# Patient Record
Sex: MALE | Race: WHITE | NOT HISPANIC OR LATINO | Employment: OTHER | URBAN - METROPOLITAN AREA
[De-identification: names, ages, dates, MRNs, and addresses within clinical notes are randomized per-mention and may not be internally consistent; named-entity substitution may affect disease eponyms.]

---

## 2023-03-07 PROBLEM — E53.8 VITAMIN B 12 DEFICIENCY: Status: ACTIVE | Noted: 2023-03-07

## 2023-03-07 PROBLEM — M54.12 RADICULITIS INVOLVING UPPER EXTREMITY: Status: ACTIVE | Noted: 2023-03-07

## 2023-03-07 PROBLEM — R60.9 EDEMA: Status: ACTIVE | Noted: 2023-03-07

## 2023-03-07 PROBLEM — E78.5 HYPERLIPIDEMIA: Status: ACTIVE | Noted: 2023-03-07

## 2023-03-07 PROBLEM — B35.4 TINEA CORPORIS: Status: ACTIVE | Noted: 2023-03-07

## 2023-03-07 PROBLEM — E66.01 CLASS 2 SEVERE OBESITY DUE TO EXCESS CALORIES WITH SERIOUS COMORBIDITY AND BODY MASS INDEX (BMI) OF 39.0 TO 39.9 IN ADULT (MULTI): Status: ACTIVE | Noted: 2023-03-07

## 2023-03-07 PROBLEM — F32.A DEPRESSION: Status: ACTIVE | Noted: 2023-03-07

## 2023-03-07 PROBLEM — D72.819 LEUKOPENIA: Status: ACTIVE | Noted: 2023-03-07

## 2023-03-07 PROBLEM — M19.90 OSTEOARTHRITIS: Status: ACTIVE | Noted: 2023-03-07

## 2023-03-07 PROBLEM — L08.9 INFECTED SEBACEOUS CYST: Status: ACTIVE | Noted: 2023-03-07

## 2023-03-07 PROBLEM — M65.312 TRIGGER FINGER OF LEFT THUMB: Status: ACTIVE | Noted: 2023-03-07

## 2023-03-07 PROBLEM — E66.812 CLASS 2 OBESITY WITH BODY MASS INDEX (BMI) OF 39.0 TO 39.9 IN ADULT: Status: ACTIVE | Noted: 2023-03-07

## 2023-03-07 PROBLEM — R20.0 NUMBNESS OF TOES: Status: ACTIVE | Noted: 2023-03-07

## 2023-03-07 PROBLEM — G47.33 OBSTRUCTIVE SLEEP APNEA, ADULT: Status: ACTIVE | Noted: 2023-03-07

## 2023-03-07 PROBLEM — M79.644 BILATERAL THUMB PAIN: Status: ACTIVE | Noted: 2023-03-07

## 2023-03-07 PROBLEM — H91.93 HEARING LOSS, BILATERAL: Status: ACTIVE | Noted: 2023-03-07

## 2023-03-07 PROBLEM — E66.9 CLASS 2 OBESITY WITH BODY MASS INDEX (BMI) OF 39.0 TO 39.9 IN ADULT: Status: ACTIVE | Noted: 2023-03-07

## 2023-03-07 PROBLEM — L71.9 ROSACEA: Status: ACTIVE | Noted: 2023-03-07

## 2023-03-07 PROBLEM — M79.645 BILATERAL THUMB PAIN: Status: ACTIVE | Noted: 2023-03-07

## 2023-03-07 PROBLEM — R11.0 NAUSEA: Status: ACTIVE | Noted: 2023-03-07

## 2023-03-07 PROBLEM — M65.4 DE QUERVAIN'S TENOSYNOVITIS, LEFT: Status: ACTIVE | Noted: 2023-03-07

## 2023-03-07 PROBLEM — N17.9 AKI (ACUTE KIDNEY INJURY) (CMS-HCC): Status: ACTIVE | Noted: 2023-03-07

## 2023-03-07 PROBLEM — N52.9 ERECTILE DYSFUNCTION: Status: ACTIVE | Noted: 2023-03-07

## 2023-03-07 PROBLEM — E66.812 CLASS 2 SEVERE OBESITY DUE TO EXCESS CALORIES WITH SERIOUS COMORBIDITY AND BODY MASS INDEX (BMI) OF 39.0 TO 39.9 IN ADULT: Status: ACTIVE | Noted: 2023-03-07

## 2023-03-07 PROBLEM — M54.10 RADICULITIS OF LEG: Status: ACTIVE | Noted: 2023-03-07

## 2023-03-07 PROBLEM — R61 DIAPHORESIS: Status: ACTIVE | Noted: 2023-03-07

## 2023-03-07 PROBLEM — E53.8 FOLIC ACID DEFICIENCY: Status: ACTIVE | Noted: 2023-03-07

## 2023-03-07 PROBLEM — B35.1 ONYCHOMYCOSIS OF TOENAIL: Status: ACTIVE | Noted: 2023-03-07

## 2023-03-07 PROBLEM — E11.9 DIABETES MELLITUS TYPE 2, CONTROLLED (MULTI): Status: ACTIVE | Noted: 2023-03-07

## 2023-03-07 PROBLEM — J30.9 ALLERGIC RHINITIS: Status: ACTIVE | Noted: 2023-03-07

## 2023-03-07 PROBLEM — M17.12 PRIMARY LOCALIZED OSTEOARTHRITIS OF LEFT KNEE: Status: ACTIVE | Noted: 2023-03-07

## 2023-03-07 PROBLEM — R31.0 GROSS HEMATURIA: Status: ACTIVE | Noted: 2023-03-07

## 2023-03-07 PROBLEM — M18.0 ARTHRITIS OF CARPOMETACARPAL (CMC) JOINT OF BOTH THUMBS: Status: ACTIVE | Noted: 2023-03-07

## 2023-03-07 PROBLEM — L72.3 INFECTED SEBACEOUS CYST: Status: ACTIVE | Noted: 2023-03-07

## 2023-03-07 PROBLEM — N40.0 BENIGN PROSTATIC HYPERPLASIA: Status: ACTIVE | Noted: 2023-03-07

## 2023-03-07 PROBLEM — R26.2 AMBULATORY DYSFUNCTION: Status: ACTIVE | Noted: 2023-03-07

## 2023-03-07 RX ORDER — BLOOD SUGAR DIAGNOSTIC
STRIP MISCELLANEOUS
COMMUNITY
Start: 2022-08-31 | End: 2024-02-14 | Stop reason: SDUPTHER

## 2023-03-07 RX ORDER — FUROSEMIDE 40 MG/1
1 TABLET ORAL DAILY
COMMUNITY
Start: 2021-06-08 | End: 2023-07-10

## 2023-03-07 RX ORDER — DOXYCYCLINE HYCLATE 50 MG/1
CAPSULE ORAL
COMMUNITY
Start: 2022-07-28 | End: 2023-03-08 | Stop reason: ALTCHOICE

## 2023-03-07 RX ORDER — FENOFIBRATE 160 MG/1
1 TABLET ORAL DAILY
COMMUNITY
Start: 2020-09-14 | End: 2023-03-08

## 2023-03-07 RX ORDER — ISOSORBIDE MONONITRATE 30 MG/1
1 TABLET, EXTENDED RELEASE ORAL DAILY
COMMUNITY
Start: 2022-08-04 | End: 2023-07-10

## 2023-03-07 RX ORDER — SIMVASTATIN 80 MG/1
1 TABLET, FILM COATED ORAL DAILY
COMMUNITY
Start: 2014-02-17 | End: 2023-03-08

## 2023-03-07 RX ORDER — ASPIRIN 81 MG/1
1 TABLET ORAL DAILY
COMMUNITY
Start: 2015-10-28 | End: 2023-03-08

## 2023-03-07 RX ORDER — FLUTICASONE PROPIONATE 50 MCG
2 SPRAY, SUSPENSION (ML) NASAL DAILY
COMMUNITY
Start: 2016-10-11

## 2023-03-07 RX ORDER — DAPAGLIFLOZIN 10 MG/1
1 TABLET, FILM COATED ORAL DAILY
COMMUNITY
End: 2023-05-04 | Stop reason: SDUPTHER

## 2023-03-08 ENCOUNTER — OFFICE VISIT (OUTPATIENT)
Dept: PRIMARY CARE | Facility: CLINIC | Age: 78
End: 2023-03-08
Payer: MEDICARE

## 2023-03-08 VITALS
OXYGEN SATURATION: 97 % | RESPIRATION RATE: 16 BRPM | BODY MASS INDEX: 39.14 KG/M2 | SYSTOLIC BLOOD PRESSURE: 128 MMHG | HEIGHT: 71 IN | WEIGHT: 279.6 LBS | HEART RATE: 55 BPM | TEMPERATURE: 96.6 F | DIASTOLIC BLOOD PRESSURE: 64 MMHG
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
## 2023-03-08 PROCEDURE — 1157F ADVNC CARE PLAN IN RCRD: CPT | Performed by: STUDENT IN AN ORGANIZED HEALTH CARE EDUCATION/TRAINING PROGRAM

## 2023-03-08 PROCEDURE — 99213 OFFICE O/P EST LOW 20 MIN: CPT | Performed by: STUDENT IN AN ORGANIZED HEALTH CARE EDUCATION/TRAINING PROGRAM

## 2023-03-08 PROCEDURE — 1160F RVW MEDS BY RX/DR IN RCRD: CPT | Performed by: STUDENT IN AN ORGANIZED HEALTH CARE EDUCATION/TRAINING PROGRAM

## 2023-03-08 PROCEDURE — 1159F MED LIST DOCD IN RCRD: CPT | Performed by: STUDENT IN AN ORGANIZED HEALTH CARE EDUCATION/TRAINING PROGRAM

## 2023-03-08 PROCEDURE — 1036F TOBACCO NON-USER: CPT | Performed by: STUDENT IN AN ORGANIZED HEALTH CARE EDUCATION/TRAINING PROGRAM

## 2023-03-08 RX ORDER — MUPIROCIN CALCIUM 20 MG/G
CREAM TOPICAL 3 TIMES DAILY
Qty: 15 G | Refills: 0 | Status: SHIPPED | OUTPATIENT
Start: 2023-03-08 | End: 2023-03-18

## 2023-03-08 ASSESSMENT — ENCOUNTER SYMPTOMS
LOSS OF SENSATION IN FEET: 0
OCCASIONAL FEELINGS OF UNSTEADINESS: 0
DEPRESSION: 0

## 2023-03-08 NOTE — PROGRESS NOTES
Subjective   Patient ID: Qamar Lowe Jr. is a 77 y.o. male who presents for Animal Bite (Pt is here for a dog bite that occurred on Saturday. He was seen at the Urgent Care and was given a tetanus shot.).  Pt was at his mother's 89th birthday party when a dog lunged at his niece. He got in front of her and the dog latched onto his left hand. He had several puncture wounds on his left hand. He presented to the urgent care that night.     Urgent care: he was given Td update and augmentin ds on Sunday   Bit Saturday but did not start medications until Sunday.         Animal Bite   The incident occurred more than 2 days ago. There is an injury to the Left hand. The pain is mild. It is unlikely that a foreign body is present. Pertinent negatives include no fussiness and no numbness. There have been no prior injuries to these areas. He is Right-handed. His tetanus status is UTD. He has been Behaving normally.       Review of Systems   Neurological:  Negative for numbness.       Objective   Physical Exam  Vitals reviewed.   Left thumb 4 bite marks posterior hand one puncutre castro   Mild erythema noted, no swelling, joint movement not impaired     Assessment/Plan   Diagnoses and all orders for this visit:  Dog bite, initial encounter  Comments:  mild erythema  no fluctuation, fever or signs of systemic disease   finish antibiotic course   keep wound open when he can once stops bleeding   no neosporin  Orders:  -     mupirocin (Bactroban) 2 % cream; Apply topically 3 times a day for 10 days. apply to affected area

## 2023-03-22 ASSESSMENT — ENCOUNTER SYMPTOMS
NUMBNESS: 0
FUSSINESS: 0

## 2023-05-04 DIAGNOSIS — E11.9 CONTROLLED TYPE 2 DIABETES MELLITUS WITHOUT COMPLICATION, WITHOUT LONG-TERM CURRENT USE OF INSULIN (MULTI): ICD-10-CM

## 2023-05-04 RX ORDER — DAPAGLIFLOZIN 10 MG/1
10 TABLET, FILM COATED ORAL DAILY
Qty: 90 TABLET | Refills: 1 | Status: SHIPPED | OUTPATIENT
Start: 2023-05-04 | End: 2023-06-29

## 2023-05-08 DIAGNOSIS — E78.5 HYPERLIPIDEMIA, UNSPECIFIED HYPERLIPIDEMIA TYPE: Primary | ICD-10-CM

## 2023-05-09 RX ORDER — FENOFIBRATE 160 MG/1
TABLET ORAL
Qty: 90 TABLET | Refills: 3 | Status: SHIPPED | OUTPATIENT
Start: 2023-05-09 | End: 2024-02-14

## 2023-05-12 LAB
ALANINE AMINOTRANSFERASE (SGPT) (U/L) IN SER/PLAS: 13 U/L (ref 10–52)
ALBUMIN (G/DL) IN SER/PLAS: 3.7 G/DL (ref 3.4–5)
ALKALINE PHOSPHATASE (U/L) IN SER/PLAS: 49 U/L (ref 33–136)
ANION GAP IN SER/PLAS: 12 MMOL/L (ref 10–20)
ASPARTATE AMINOTRANSFERASE (SGOT) (U/L) IN SER/PLAS: 19 U/L (ref 9–39)
BASOPHILS (10*3/UL) IN BLOOD BY AUTOMATED COUNT: 0.02 X10E9/L (ref 0–0.1)
BASOPHILS/100 LEUKOCYTES IN BLOOD BY AUTOMATED COUNT: 0.5 % (ref 0–2)
BILIRUBIN TOTAL (MG/DL) IN SER/PLAS: 0.7 MG/DL (ref 0–1.2)
CALCIUM (MG/DL) IN SER/PLAS: 8.7 MG/DL (ref 8.6–10.6)
CARBON DIOXIDE, TOTAL (MMOL/L) IN SER/PLAS: 26 MMOL/L (ref 21–32)
CHLORIDE (MMOL/L) IN SER/PLAS: 110 MMOL/L (ref 98–107)
CREATININE (MG/DL) IN SER/PLAS: 0.85 MG/DL (ref 0.5–1.3)
EOSINOPHILS (10*3/UL) IN BLOOD BY AUTOMATED COUNT: 0.14 X10E9/L (ref 0–0.4)
EOSINOPHILS/100 LEUKOCYTES IN BLOOD BY AUTOMATED COUNT: 3.8 % (ref 0–6)
ERYTHROCYTE DISTRIBUTION WIDTH (RATIO) BY AUTOMATED COUNT: 13.3 % (ref 11.5–14.5)
ERYTHROCYTE MEAN CORPUSCULAR HEMOGLOBIN CONCENTRATION (G/DL) BY AUTOMATED: 32.5 G/DL (ref 32–36)
ERYTHROCYTE MEAN CORPUSCULAR VOLUME (FL) BY AUTOMATED COUNT: 91 FL (ref 80–100)
ERYTHROCYTES (10*6/UL) IN BLOOD BY AUTOMATED COUNT: 4.96 X10E12/L (ref 4.5–5.9)
GFR MALE: 88 ML/MIN/1.73M2
GLUCOSE (MG/DL) IN SER/PLAS: 108 MG/DL (ref 74–99)
HEMATOCRIT (%) IN BLOOD BY AUTOMATED COUNT: 45.2 % (ref 41–52)
HEMOGLOBIN (G/DL) IN BLOOD: 14.7 G/DL (ref 13.5–17.5)
IMMATURE GRANULOCYTES/100 LEUKOCYTES IN BLOOD BY AUTOMATED COUNT: 0.3 % (ref 0–0.9)
LEUKOCYTES (10*3/UL) IN BLOOD BY AUTOMATED COUNT: 3.7 X10E9/L (ref 4.4–11.3)
LYMPHOCYTES (10*3/UL) IN BLOOD BY AUTOMATED COUNT: 1.11 X10E9/L (ref 0.8–3)
LYMPHOCYTES/100 LEUKOCYTES IN BLOOD BY AUTOMATED COUNT: 29.8 % (ref 13–44)
MONOCYTES (10*3/UL) IN BLOOD BY AUTOMATED COUNT: 0.42 X10E9/L (ref 0.05–0.8)
MONOCYTES/100 LEUKOCYTES IN BLOOD BY AUTOMATED COUNT: 11.3 % (ref 2–10)
NEUTROPHILS (10*3/UL) IN BLOOD BY AUTOMATED COUNT: 2.03 X10E9/L (ref 1.6–5.5)
NEUTROPHILS/100 LEUKOCYTES IN BLOOD BY AUTOMATED COUNT: 54.3 % (ref 40–80)
NRBC (PER 100 WBCS) BY AUTOMATED COUNT: 0 /100 WBC (ref 0–0)
PLATELETS (10*3/UL) IN BLOOD AUTOMATED COUNT: 247 X10E9/L (ref 150–450)
POTASSIUM (MMOL/L) IN SER/PLAS: 4.5 MMOL/L (ref 3.5–5.3)
PROTEIN TOTAL: 6 G/DL (ref 6.4–8.2)
SODIUM (MMOL/L) IN SER/PLAS: 143 MMOL/L (ref 136–145)
UREA NITROGEN (MG/DL) IN SER/PLAS: 21 MG/DL (ref 6–23)

## 2023-05-22 ENCOUNTER — OFFICE VISIT (OUTPATIENT)
Dept: PRIMARY CARE | Facility: CLINIC | Age: 78
End: 2023-05-22
Payer: MEDICARE

## 2023-05-22 VITALS
RESPIRATION RATE: 16 BRPM | BODY MASS INDEX: 38.38 KG/M2 | SYSTOLIC BLOOD PRESSURE: 120 MMHG | HEIGHT: 71 IN | HEART RATE: 58 BPM | DIASTOLIC BLOOD PRESSURE: 68 MMHG | WEIGHT: 274.14 LBS | OXYGEN SATURATION: 96 %

## 2023-05-22 DIAGNOSIS — E66.01 CLASS 2 SEVERE OBESITY DUE TO EXCESS CALORIES WITH SERIOUS COMORBIDITY AND BODY MASS INDEX (BMI) OF 39.0 TO 39.9 IN ADULT (MULTI): ICD-10-CM

## 2023-05-22 DIAGNOSIS — E53.8 FOLIC ACID DEFICIENCY: ICD-10-CM

## 2023-05-22 DIAGNOSIS — R35.0 BENIGN PROSTATIC HYPERPLASIA WITH URINARY FREQUENCY: ICD-10-CM

## 2023-05-22 DIAGNOSIS — N40.1 BENIGN PROSTATIC HYPERPLASIA WITH URINARY FREQUENCY: ICD-10-CM

## 2023-05-22 DIAGNOSIS — E53.8 VITAMIN B 12 DEFICIENCY: ICD-10-CM

## 2023-05-22 DIAGNOSIS — E78.5 HYPERLIPIDEMIA, UNSPECIFIED HYPERLIPIDEMIA TYPE: ICD-10-CM

## 2023-05-22 DIAGNOSIS — E11.8 CONTROLLED TYPE 2 DIABETES MELLITUS WITH COMPLICATION, WITHOUT LONG-TERM CURRENT USE OF INSULIN (MULTI): Primary | ICD-10-CM

## 2023-05-22 PROBLEM — R11.0 NAUSEA: Status: RESOLVED | Noted: 2023-03-07 | Resolved: 2023-05-22

## 2023-05-22 PROBLEM — R31.0 GROSS HEMATURIA: Status: RESOLVED | Noted: 2023-03-07 | Resolved: 2023-05-22

## 2023-05-22 PROBLEM — B35.4 TINEA CORPORIS: Status: RESOLVED | Noted: 2023-03-07 | Resolved: 2023-05-22

## 2023-05-22 PROBLEM — L08.9 INFECTED SEBACEOUS CYST: Status: RESOLVED | Noted: 2023-03-07 | Resolved: 2023-05-22

## 2023-05-22 PROBLEM — N17.9 AKI (ACUTE KIDNEY INJURY) (CMS-HCC): Status: RESOLVED | Noted: 2023-03-07 | Resolved: 2023-05-22

## 2023-05-22 PROBLEM — L72.3 INFECTED SEBACEOUS CYST: Status: RESOLVED | Noted: 2023-03-07 | Resolved: 2023-05-22

## 2023-05-22 LAB — POC HEMOGLOBIN A1C: 6.1 % (ref 4.2–6.5)

## 2023-05-22 PROCEDURE — 1160F RVW MEDS BY RX/DR IN RCRD: CPT | Performed by: STUDENT IN AN ORGANIZED HEALTH CARE EDUCATION/TRAINING PROGRAM

## 2023-05-22 PROCEDURE — 1036F TOBACCO NON-USER: CPT | Performed by: STUDENT IN AN ORGANIZED HEALTH CARE EDUCATION/TRAINING PROGRAM

## 2023-05-22 PROCEDURE — 83036 HEMOGLOBIN GLYCOSYLATED A1C: CPT | Performed by: STUDENT IN AN ORGANIZED HEALTH CARE EDUCATION/TRAINING PROGRAM

## 2023-05-22 PROCEDURE — 1159F MED LIST DOCD IN RCRD: CPT | Performed by: STUDENT IN AN ORGANIZED HEALTH CARE EDUCATION/TRAINING PROGRAM

## 2023-05-22 PROCEDURE — 1157F ADVNC CARE PLAN IN RCRD: CPT | Performed by: STUDENT IN AN ORGANIZED HEALTH CARE EDUCATION/TRAINING PROGRAM

## 2023-05-22 PROCEDURE — 99214 OFFICE O/P EST MOD 30 MIN: CPT | Performed by: STUDENT IN AN ORGANIZED HEALTH CARE EDUCATION/TRAINING PROGRAM

## 2023-05-22 RX ORDER — TAMSULOSIN HYDROCHLORIDE 0.4 MG/1
0.4 CAPSULE ORAL DAILY
Qty: 90 CAPSULE | Refills: 1 | Status: SHIPPED | OUTPATIENT
Start: 2023-05-22 | End: 2024-05-21

## 2023-05-22 RX ORDER — SIMVASTATIN 80 MG/1
TABLET, FILM COATED ORAL
COMMUNITY
Start: 2023-05-15 | End: 2023-07-10

## 2023-05-22 RX ORDER — AMOXICILLIN 500 MG/1
CAPSULE ORAL
COMMUNITY
Start: 2023-04-20 | End: 2023-08-17 | Stop reason: ALTCHOICE

## 2023-05-22 ASSESSMENT — PATIENT HEALTH QUESTIONNAIRE - PHQ9
2. FEELING DOWN, DEPRESSED OR HOPELESS: NOT AT ALL
1. LITTLE INTEREST OR PLEASURE IN DOING THINGS: NOT AT ALL
SUM OF ALL RESPONSES TO PHQ9 QUESTIONS 1 AND 2: 0

## 2023-05-22 ASSESSMENT — ENCOUNTER SYMPTOMS
FATIGUE: 0
POLYDIPSIA: 0
BLURRED VISION: 0

## 2023-05-22 NOTE — PROGRESS NOTES
Subjective   Patient ID: Qamar Lowe Jr. is a 78 y.o. male who presents for Diabetes (Pt is here for a 6 month follow up.).    Pain across the chest worse with head movement   Lasted two days then went away   Two months ago   Lifting at work     Diabetes  He presents for his follow-up diabetic visit. He has type 2 diabetes mellitus. No MedicAlert identification noted. His disease course has been stable (doing well, log brought in with him today). Pertinent negatives for diabetes include no blurred vision, no chest pain, no fatigue, no foot ulcerations and no polydipsia.       Review of Systems   Constitutional:  Negative for fatigue.   Eyes:  Negative for blurred vision.   Cardiovascular:  Negative for chest pain.   Endocrine: Negative for polydipsia.       Objective   Physical Exam  Constitutional:       Appearance: Normal appearance.   Cardiovascular:      Rate and Rhythm: Normal rate and regular rhythm.      Heart sounds: No murmur heard.  Pulmonary:      Effort: Pulmonary effort is normal. No respiratory distress.      Breath sounds: Normal breath sounds. No wheezing.   Musculoskeletal:      Cervical back: Neck supple.      Left lower leg: No edema.   Neurological:      Mental Status: He is alert.         Assessment/Plan   Problem List Items Addressed This Visit          Genitourinary    Benign prostatic hyperplasia    Relevant Medications    tamsulosin (Flomax) 0.4 mg 24 hr capsule    Other Relevant Orders    Prostate Spec.Ag,Screen       Endocrine/Metabolic    Diabetes mellitus type 2, controlled (CMS/HCC) - Primary    Relevant Orders    CBC and Auto Differential    Comprehensive metabolic panel    Tsh With Reflex To Free T4 If Abnormal    POCT glycosylated hemoglobin (Hb A1C) manually resulted (Completed)    Folic acid deficiency    Vitamin B 12 deficiency    Relevant Orders    Vitamin B12    Class 2 severe obesity due to excess calories with serious comorbidity and body mass index (BMI) of 39.0 to 39.9  in adult (CMS/HCC)       Other    Hyperlipidemia    Relevant Orders    Lipid Panel

## 2023-05-22 NOTE — LETTER
May 22, 2023     Patient: Qamar Lowe Jr.   YOB: 1945   Date of Visit: 5/22/2023       To Whom It May Concern:    It is my medical opinion that Qamar Lowe may return to light duty immediately with the following restrictions: no lifting more than 20lbs .    If you have any questions or concerns, please don't hesitate to call.         Sincerely,        María Elena Scott DO    CC: No Recipients

## 2023-06-29 DIAGNOSIS — E11.9 CONTROLLED TYPE 2 DIABETES MELLITUS WITHOUT COMPLICATION, WITHOUT LONG-TERM CURRENT USE OF INSULIN (MULTI): ICD-10-CM

## 2023-06-29 RX ORDER — DAPAGLIFLOZIN 10 MG/1
TABLET, FILM COATED ORAL
Qty: 90 TABLET | Refills: 1 | Status: SHIPPED | OUTPATIENT
Start: 2023-06-29 | End: 2023-08-01

## 2023-07-09 DIAGNOSIS — Z00.00 HEALTHCARE MAINTENANCE: ICD-10-CM

## 2023-07-09 DIAGNOSIS — R60.9 EDEMA, UNSPECIFIED TYPE: ICD-10-CM

## 2023-07-09 DIAGNOSIS — E78.5 HYPERLIPIDEMIA, UNSPECIFIED HYPERLIPIDEMIA TYPE: ICD-10-CM

## 2023-07-10 RX ORDER — FUROSEMIDE 40 MG/1
TABLET ORAL
Qty: 90 TABLET | Refills: 1 | Status: SHIPPED | OUTPATIENT
Start: 2023-07-10 | End: 2023-10-03

## 2023-07-10 RX ORDER — ISOSORBIDE MONONITRATE 30 MG/1
TABLET, EXTENDED RELEASE ORAL
Qty: 90 TABLET | Refills: 1 | Status: SHIPPED | OUTPATIENT
Start: 2023-07-10 | End: 2023-10-03

## 2023-07-10 RX ORDER — SIMVASTATIN 80 MG/1
TABLET, FILM COATED ORAL
Qty: 90 TABLET | Refills: 1 | Status: SHIPPED | OUTPATIENT
Start: 2023-07-10 | End: 2024-02-13

## 2023-08-01 ENCOUNTER — TELEPHONE (OUTPATIENT)
Dept: PRIMARY CARE | Facility: CLINIC | Age: 78
End: 2023-08-01
Payer: MEDICARE

## 2023-08-01 DIAGNOSIS — E11.8 CONTROLLED TYPE 2 DIABETES MELLITUS WITH COMPLICATION, WITHOUT LONG-TERM CURRENT USE OF INSULIN (MULTI): ICD-10-CM

## 2023-08-01 DIAGNOSIS — E11.8 CONTROLLED TYPE 2 DIABETES MELLITUS WITH COMPLICATION, WITHOUT LONG-TERM CURRENT USE OF INSULIN (MULTI): Primary | ICD-10-CM

## 2023-08-01 RX ORDER — PIOGLITAZONEHYDROCHLORIDE 30 MG/1
30 TABLET ORAL DAILY
Qty: 90 TABLET | Refills: 1 | Status: SHIPPED | OUTPATIENT
Start: 2023-08-01 | End: 2023-10-09

## 2023-08-01 RX ORDER — PIOGLITAZONEHYDROCHLORIDE 30 MG/1
30 TABLET ORAL DAILY
Qty: 90 TABLET | Refills: 1 | Status: SHIPPED | OUTPATIENT
Start: 2023-08-01 | End: 2023-08-01 | Stop reason: SDUPTHER

## 2023-08-01 NOTE — PROGRESS NOTES
Patient called the office to report an increase in his Farxiga cost to above $500Pt called the office to report an above $500 cost to his farxiga.

## 2023-08-15 ENCOUNTER — TELEPHONE (OUTPATIENT)
Dept: PRIMARY CARE | Facility: CLINIC | Age: 78
End: 2023-08-15
Payer: MEDICARE

## 2023-08-17 ENCOUNTER — OFFICE VISIT (OUTPATIENT)
Dept: PRIMARY CARE | Facility: CLINIC | Age: 78
End: 2023-08-17
Payer: MEDICARE

## 2023-08-17 VITALS
DIASTOLIC BLOOD PRESSURE: 54 MMHG | BODY MASS INDEX: 37.74 KG/M2 | HEART RATE: 59 BPM | HEIGHT: 71 IN | RESPIRATION RATE: 18 BRPM | SYSTOLIC BLOOD PRESSURE: 124 MMHG | WEIGHT: 269.6 LBS | OXYGEN SATURATION: 95 %

## 2023-08-17 DIAGNOSIS — R19.7 DIARRHEA, UNSPECIFIED TYPE: Primary | ICD-10-CM

## 2023-08-17 PROCEDURE — 1160F RVW MEDS BY RX/DR IN RCRD: CPT | Performed by: STUDENT IN AN ORGANIZED HEALTH CARE EDUCATION/TRAINING PROGRAM

## 2023-08-17 PROCEDURE — 1157F ADVNC CARE PLAN IN RCRD: CPT | Performed by: STUDENT IN AN ORGANIZED HEALTH CARE EDUCATION/TRAINING PROGRAM

## 2023-08-17 PROCEDURE — 99213 OFFICE O/P EST LOW 20 MIN: CPT | Performed by: STUDENT IN AN ORGANIZED HEALTH CARE EDUCATION/TRAINING PROGRAM

## 2023-08-17 PROCEDURE — 1159F MED LIST DOCD IN RCRD: CPT | Performed by: STUDENT IN AN ORGANIZED HEALTH CARE EDUCATION/TRAINING PROGRAM

## 2023-08-17 PROCEDURE — 1036F TOBACCO NON-USER: CPT | Performed by: STUDENT IN AN ORGANIZED HEALTH CARE EDUCATION/TRAINING PROGRAM

## 2023-08-17 RX ORDER — ISOSORBIDE DINITRATE 30 MG/1
30 TABLET ORAL 4 TIMES DAILY
COMMUNITY

## 2023-08-17 RX ORDER — DICYCLOMINE HYDROCHLORIDE 10 MG/1
10 CAPSULE ORAL 4 TIMES DAILY PRN
Qty: 30 CAPSULE | Refills: 0 | Status: SHIPPED | OUTPATIENT
Start: 2023-08-17 | End: 2023-10-16

## 2023-08-17 ASSESSMENT — PATIENT HEALTH QUESTIONNAIRE - PHQ9
1. LITTLE INTEREST OR PLEASURE IN DOING THINGS: NOT AT ALL
SUM OF ALL RESPONSES TO PHQ9 QUESTIONS 1 AND 2: 0
2. FEELING DOWN, DEPRESSED OR HOPELESS: NOT AT ALL

## 2023-08-17 ASSESSMENT — ENCOUNTER SYMPTOMS
DIARRHEA: 1
ABDOMINAL PAIN: 1
MYALGIAS: 1

## 2023-08-17 NOTE — PROGRESS NOTES
Subjective   Patient ID: Qamar Lowe Jr. is a 78 y.o. male who presents for Diarrhea (Pt is here for diarrhea for the last 3 weeks.).  Diarrhea   This is a new problem. The current episode started 1 to 4 weeks ago. The problem occurs 5 to 10 times per day. The problem has been gradually improving. The stool consistency is described as Watery. The patient states that diarrhea awakens him from sleep. Associated symptoms include abdominal pain and myalgias. Associated symptoms comments: Not associated with food/eating  Mild cramping. There are no known risk factors. He has tried bismuth subsalicylate for the symptoms.       Review of Systems   Gastrointestinal:  Positive for abdominal pain and diarrhea.   Musculoskeletal:  Positive for myalgias.       Objective   Physical Exam  Vitals reviewed.   Constitutional:       Appearance: Normal appearance.   Cardiovascular:      Rate and Rhythm: Normal rate and regular rhythm.      Heart sounds: No murmur heard.  Pulmonary:      Effort: Pulmonary effort is normal. No respiratory distress.      Breath sounds: Normal breath sounds. No wheezing.   Musculoskeletal:      Cervical back: Neck supple.      Left lower leg: No edema.   Neurological:      Mental Status: He is alert.         Assessment/Plan   Problem List Items Addressed This Visit    None  Visit Diagnoses       Diarrhea, unspecified type    -  Primary    likely 2/2 to increase in stress  will r/o infectious cuases    Relevant Medications    dicyclomine (Bentyl) 10 mg capsule    Other Relevant Orders    Stool Pathogen Panel, PCR    Ova/Para + Giardia/Cryptosporidium Antigen

## 2023-08-18 ENCOUNTER — LAB (OUTPATIENT)
Dept: LAB | Facility: LAB | Age: 78
End: 2023-08-18
Payer: MEDICARE

## 2023-08-18 DIAGNOSIS — R19.7 DIARRHEA, UNSPECIFIED TYPE: ICD-10-CM

## 2023-08-21 ENCOUNTER — TELEPHONE (OUTPATIENT)
Dept: PRIMARY CARE | Facility: CLINIC | Age: 78
End: 2023-08-21
Payer: MEDICARE

## 2023-08-29 ENCOUNTER — TELEPHONE (OUTPATIENT)
Dept: PRIMARY CARE | Facility: CLINIC | Age: 78
End: 2023-08-29
Payer: MEDICARE

## 2023-08-29 DIAGNOSIS — R19.7 DIARRHEA, UNSPECIFIED TYPE: Primary | ICD-10-CM

## 2023-10-09 DIAGNOSIS — E11.8 CONTROLLED TYPE 2 DIABETES MELLITUS WITH COMPLICATION, WITHOUT LONG-TERM CURRENT USE OF INSULIN (MULTI): ICD-10-CM

## 2023-10-09 RX ORDER — PIOGLITAZONEHYDROCHLORIDE 30 MG/1
30 TABLET ORAL DAILY
Qty: 100 TABLET | Refills: 2 | Status: SHIPPED | OUTPATIENT
Start: 2023-10-09 | End: 2023-11-28 | Stop reason: SDUPTHER

## 2023-11-14 ENCOUNTER — TELEPHONE (OUTPATIENT)
Dept: PRIMARY CARE | Facility: CLINIC | Age: 78
End: 2023-11-14
Payer: MEDICARE

## 2023-11-14 NOTE — TELEPHONE ENCOUNTER
PT CALLED THE OFFICE TODAY STATING THAT HIS CPAP HAS BROKE AND THAT IT IS APPROXIMATELY 20 YEARS OLD. PT WOULD LIKE A CALL/FAX SENT OVER TO [facility]. THE FAX SHOULD CONTAIN A COPY OF THE SLEEP STUDY, CHART NOTES 3-6 MONTHS PRIOR TO THE STUDY AND A NEW ORDER FROM DR. ANAYA.      I CALLED BACK AND SPOKE WITH THE PT INFORMING HIM THAT HIS LAST SLEEP STUDY WAS DONE IN 2006 AND THEY HE WILL NEED TO HAVE A NEW STUDY DONE. I INFORMED THE PT THAT HE WOULD NEED TO MAKE AN APPOINTMENT TO GET THIS DONE. PT STATED THAT HE HAS AN APPOINTMENT ON 11/28. I ADVISED HIM TO KEEP THAT APPOINTMENT AND DISCUSS IT THERE.

## 2023-11-21 ENCOUNTER — LAB (OUTPATIENT)
Dept: LAB | Facility: LAB | Age: 78
End: 2023-11-21
Payer: MEDICARE

## 2023-11-21 DIAGNOSIS — E11.8 CONTROLLED TYPE 2 DIABETES MELLITUS WITH COMPLICATION, WITHOUT LONG-TERM CURRENT USE OF INSULIN (MULTI): ICD-10-CM

## 2023-11-21 DIAGNOSIS — E53.8 VITAMIN B 12 DEFICIENCY: ICD-10-CM

## 2023-11-21 DIAGNOSIS — E78.5 HYPERLIPIDEMIA, UNSPECIFIED HYPERLIPIDEMIA TYPE: ICD-10-CM

## 2023-11-21 DIAGNOSIS — N40.1 BENIGN PROSTATIC HYPERPLASIA WITH URINARY FREQUENCY: ICD-10-CM

## 2023-11-21 DIAGNOSIS — R35.0 BENIGN PROSTATIC HYPERPLASIA WITH URINARY FREQUENCY: ICD-10-CM

## 2023-11-21 LAB
ALBUMIN SERPL BCP-MCNC: 3.7 G/DL (ref 3.4–5)
ALP SERPL-CCNC: 39 U/L (ref 33–136)
ALT SERPL W P-5'-P-CCNC: 10 U/L (ref 10–52)
ANION GAP SERPL CALC-SCNC: 11 MMOL/L (ref 10–20)
AST SERPL W P-5'-P-CCNC: 18 U/L (ref 9–39)
BASOPHILS # BLD AUTO: 0.03 X10*3/UL (ref 0–0.1)
BASOPHILS NFR BLD AUTO: 0.8 %
BILIRUB SERPL-MCNC: 0.6 MG/DL (ref 0–1.2)
BUN SERPL-MCNC: 26 MG/DL (ref 6–23)
CALCIUM SERPL-MCNC: 8.8 MG/DL (ref 8.6–10.6)
CHLORIDE SERPL-SCNC: 107 MMOL/L (ref 98–107)
CHOLEST SERPL-MCNC: 151 MG/DL (ref 0–199)
CHOLESTEROL/HDL RATIO: 2.8
CO2 SERPL-SCNC: 27 MMOL/L (ref 21–32)
CREAT SERPL-MCNC: 0.84 MG/DL (ref 0.5–1.3)
EOSINOPHIL # BLD AUTO: 0.2 X10*3/UL (ref 0–0.4)
EOSINOPHIL NFR BLD AUTO: 5.5 %
ERYTHROCYTE [DISTWIDTH] IN BLOOD BY AUTOMATED COUNT: 15.1 % (ref 11.5–14.5)
GFR SERPL CREATININE-BSD FRML MDRD: 88 ML/MIN/1.73M*2
GLUCOSE SERPL-MCNC: 95 MG/DL (ref 74–99)
HCT VFR BLD AUTO: 42.7 % (ref 41–52)
HDLC SERPL-MCNC: 53 MG/DL
HGB BLD-MCNC: 13.5 G/DL (ref 13.5–17.5)
IMM GRANULOCYTES # BLD AUTO: 0 X10*3/UL (ref 0–0.5)
IMM GRANULOCYTES NFR BLD AUTO: 0 % (ref 0–0.9)
LDLC SERPL CALC-MCNC: 84 MG/DL
LYMPHOCYTES # BLD AUTO: 0.97 X10*3/UL (ref 0.8–3)
LYMPHOCYTES NFR BLD AUTO: 26.6 %
MCH RBC QN AUTO: 29.5 PG (ref 26–34)
MCHC RBC AUTO-ENTMCNC: 31.6 G/DL (ref 32–36)
MCV RBC AUTO: 93 FL (ref 80–100)
MONOCYTES # BLD AUTO: 0.41 X10*3/UL (ref 0.05–0.8)
MONOCYTES NFR BLD AUTO: 11.2 %
NEUTROPHILS # BLD AUTO: 2.04 X10*3/UL (ref 1.6–5.5)
NEUTROPHILS NFR BLD AUTO: 55.9 %
NON HDL CHOLESTEROL: 98 MG/DL (ref 0–149)
NRBC BLD-RTO: 0 /100 WBCS (ref 0–0)
PLATELET # BLD AUTO: 207 X10*3/UL (ref 150–450)
POTASSIUM SERPL-SCNC: 4.5 MMOL/L (ref 3.5–5.3)
PROT SERPL-MCNC: 6 G/DL (ref 6.4–8.2)
PSA SERPL-MCNC: 1.81 NG/ML
RBC # BLD AUTO: 4.58 X10*6/UL (ref 4.5–5.9)
SODIUM SERPL-SCNC: 140 MMOL/L (ref 136–145)
TRIGL SERPL-MCNC: 69 MG/DL (ref 0–149)
TSH SERPL-ACNC: 2.17 MIU/L (ref 0.44–3.98)
VIT B12 SERPL-MCNC: 257 PG/ML (ref 211–911)
VLDL: 14 MG/DL (ref 0–40)
WBC # BLD AUTO: 3.7 X10*3/UL (ref 4.4–11.3)

## 2023-11-21 PROCEDURE — 80061 LIPID PANEL: CPT

## 2023-11-21 PROCEDURE — 82607 VITAMIN B-12: CPT

## 2023-11-21 PROCEDURE — 84153 ASSAY OF PSA TOTAL: CPT

## 2023-11-21 PROCEDURE — 84443 ASSAY THYROID STIM HORMONE: CPT

## 2023-11-21 PROCEDURE — 36415 COLL VENOUS BLD VENIPUNCTURE: CPT

## 2023-11-21 PROCEDURE — 80053 COMPREHEN METABOLIC PANEL: CPT

## 2023-11-21 PROCEDURE — 85025 COMPLETE CBC W/AUTO DIFF WBC: CPT

## 2023-11-28 ENCOUNTER — OFFICE VISIT (OUTPATIENT)
Dept: PRIMARY CARE | Facility: CLINIC | Age: 78
End: 2023-11-28
Payer: MEDICARE

## 2023-11-28 VITALS
WEIGHT: 281.6 LBS | BODY MASS INDEX: 39.42 KG/M2 | HEART RATE: 54 BPM | RESPIRATION RATE: 17 BRPM | HEIGHT: 71 IN | DIASTOLIC BLOOD PRESSURE: 56 MMHG | SYSTOLIC BLOOD PRESSURE: 124 MMHG | OXYGEN SATURATION: 98 %

## 2023-11-28 DIAGNOSIS — R35.0 INCREASED URINARY FREQUENCY: ICD-10-CM

## 2023-11-28 DIAGNOSIS — G47.33 OSA (OBSTRUCTIVE SLEEP APNEA): ICD-10-CM

## 2023-11-28 DIAGNOSIS — E11.8 CONTROLLED TYPE 2 DIABETES MELLITUS WITH COMPLICATION, WITHOUT LONG-TERM CURRENT USE OF INSULIN (MULTI): ICD-10-CM

## 2023-11-28 DIAGNOSIS — E53.8 VITAMIN B 12 DEFICIENCY: ICD-10-CM

## 2023-11-28 DIAGNOSIS — R61 DIAPHORESIS: ICD-10-CM

## 2023-11-28 DIAGNOSIS — E11.9 CONTROLLED TYPE 2 DIABETES MELLITUS WITHOUT COMPLICATION, WITHOUT LONG-TERM CURRENT USE OF INSULIN (MULTI): ICD-10-CM

## 2023-11-28 DIAGNOSIS — Z00.00 ROUTINE GENERAL MEDICAL EXAMINATION AT HEALTH CARE FACILITY: Primary | ICD-10-CM

## 2023-11-28 DIAGNOSIS — R19.7 DIARRHEA, UNSPECIFIED TYPE: ICD-10-CM

## 2023-11-28 DIAGNOSIS — Z23 ENCOUNTER FOR IMMUNIZATION: ICD-10-CM

## 2023-11-28 DIAGNOSIS — H91.93 BILATERAL CHANGE IN HEARING: ICD-10-CM

## 2023-11-28 LAB — POC HEMOGLOBIN A1C: 5.8 % (ref 4.2–6.5)

## 2023-11-28 PROCEDURE — 99214 OFFICE O/P EST MOD 30 MIN: CPT | Performed by: STUDENT IN AN ORGANIZED HEALTH CARE EDUCATION/TRAINING PROGRAM

## 2023-11-28 PROCEDURE — G0008 ADMIN INFLUENZA VIRUS VAC: HCPCS | Performed by: STUDENT IN AN ORGANIZED HEALTH CARE EDUCATION/TRAINING PROGRAM

## 2023-11-28 PROCEDURE — 1160F RVW MEDS BY RX/DR IN RCRD: CPT | Performed by: STUDENT IN AN ORGANIZED HEALTH CARE EDUCATION/TRAINING PROGRAM

## 2023-11-28 PROCEDURE — 1159F MED LIST DOCD IN RCRD: CPT | Performed by: STUDENT IN AN ORGANIZED HEALTH CARE EDUCATION/TRAINING PROGRAM

## 2023-11-28 PROCEDURE — 90662 IIV NO PRSV INCREASED AG IM: CPT | Performed by: STUDENT IN AN ORGANIZED HEALTH CARE EDUCATION/TRAINING PROGRAM

## 2023-11-28 PROCEDURE — G0439 PPPS, SUBSEQ VISIT: HCPCS | Performed by: STUDENT IN AN ORGANIZED HEALTH CARE EDUCATION/TRAINING PROGRAM

## 2023-11-28 PROCEDURE — 90677 PCV20 VACCINE IM: CPT | Performed by: STUDENT IN AN ORGANIZED HEALTH CARE EDUCATION/TRAINING PROGRAM

## 2023-11-28 PROCEDURE — 1036F TOBACCO NON-USER: CPT | Performed by: STUDENT IN AN ORGANIZED HEALTH CARE EDUCATION/TRAINING PROGRAM

## 2023-11-28 PROCEDURE — 83036 HEMOGLOBIN GLYCOSYLATED A1C: CPT | Performed by: STUDENT IN AN ORGANIZED HEALTH CARE EDUCATION/TRAINING PROGRAM

## 2023-11-28 PROCEDURE — G0009 ADMIN PNEUMOCOCCAL VACCINE: HCPCS | Performed by: STUDENT IN AN ORGANIZED HEALTH CARE EDUCATION/TRAINING PROGRAM

## 2023-11-28 PROCEDURE — 1170F FXNL STATUS ASSESSED: CPT | Performed by: STUDENT IN AN ORGANIZED HEALTH CARE EDUCATION/TRAINING PROGRAM

## 2023-11-28 RX ORDER — PIOGLITAZONEHYDROCHLORIDE 30 MG/1
30 TABLET ORAL DAILY
Qty: 100 TABLET | Refills: 1 | Status: SHIPPED | OUTPATIENT
Start: 2023-11-28

## 2023-11-28 ASSESSMENT — PATIENT HEALTH QUESTIONNAIRE - PHQ9
SUM OF ALL RESPONSES TO PHQ9 QUESTIONS 1 AND 2: 0
1. LITTLE INTEREST OR PLEASURE IN DOING THINGS: NOT AT ALL
2. FEELING DOWN, DEPRESSED OR HOPELESS: NOT AT ALL

## 2023-11-28 ASSESSMENT — ACTIVITIES OF DAILY LIVING (ADL)
TAKING_MEDICATION: INDEPENDENT
BATHING: INDEPENDENT
GROCERY_SHOPPING: INDEPENDENT
DRESSING: INDEPENDENT
DOING_HOUSEWORK: INDEPENDENT
MANAGING_FINANCES: INDEPENDENT

## 2023-11-28 NOTE — PROGRESS NOTES
"Subjective   Reason for Visit: Qmaar Lowe Jr. is an 78 y.o. male here for a Medicare Wellness visit.     Past Medical, Surgical, and Family History reviewed and updated in chart.  Diarrhea has stopped   Now having more constipation   Gained weight   Miralax tried every third day, will increased to   Staying very well hydrated     Legs continue to swell after a long day   Lasix is working   Noticing going up steps and carrying things are not doing well       Reviewed all medications by prescribing practitioner or clinical pharmacist (such as prescriptions, OTCs, herbal therapies and supplements) and documented in the medical record.        Patient Care Team:  María Elena Scott DO as PCP - General (Family Medicine)  María Elena Scott DO as PCP - [employer] PCP       Objective   Vitals:  /56   Pulse 54   Resp 17   Ht 1.803 m (5' 11\")   Wt 128 kg (281 lb 9.6 oz)   SpO2 98%   BMI 39.28 kg/m²       Physical Exam  Vitals reviewed.   Constitutional:       General: He is not in acute distress.     Appearance: He is not ill-appearing.   HENT:      Right Ear: Tympanic membrane and ear canal normal.      Left Ear: Tympanic membrane and ear canal normal.      Mouth/Throat:      Mouth: Mucous membranes are moist.      Pharynx: Oropharynx is clear. No oropharyngeal exudate or posterior oropharyngeal erythema.   Eyes:      Extraocular Movements: Extraocular movements intact.      Conjunctiva/sclera: Conjunctivae normal.      Pupils: Pupils are equal, round, and reactive to light.   Neck:      Vascular: No carotid bruit.   Cardiovascular:      Rate and Rhythm: Normal rate and regular rhythm.      Heart sounds: No murmur heard.     No gallop.   Pulmonary:      Effort: Pulmonary effort is normal.      Breath sounds: Normal breath sounds. No wheezing, rhonchi or rales.   Abdominal:      General: Abdomen is flat. Bowel sounds are normal.      Palpations: Abdomen is soft.      Tenderness: There is no abdominal " tenderness.   Musculoskeletal:      Cervical back: Neck supple.      Left lower leg: No edema.   Skin:     General: Skin is warm and dry.      Findings: No rash.   Neurological:      General: No focal deficit present.      Mental Status: He is alert and oriented to person, place, and time.      Gait: Gait normal.   Psychiatric:         Mood and Affect: Mood normal.         Behavior: Behavior normal.         Assessment/Plan   Problem List Items Addressed This Visit       Diabetes mellitus type 2, controlled (CMS/[facility])    Current Assessment & Plan     A1C 5.8%  Continue actos 30mg daily   Doing very well   Continue exercise            Relevant Medications    pioglitazone (Actos) 30 mg tablet    Other Relevant Orders    POCT glycosylated hemoglobin (Hb A1C) manually resulted (Completed)    Comprehensive metabolic panel    CBC and Auto Differential    Comprehensive metabolic panel    Diaphoresis    Vitamin B 12 deficiency    Relevant Orders    Vitamin B12     Other Visit Diagnoses       Routine general medical examination at health care facility    -  Primary    medicare questions answered   PSA ordered   colon ca screening complete   audiology referral given for hearing loss    Diarrhea, unspecified type        resolved   continue miralax every 2-3 days    HERMINIO (obstructive sleep apnea)        Relevant Orders    Home sleep apnea test (HSAT)    Increased urinary frequency        Relevant Orders    Prostate Spec.Ag,Screen    Bilateral change in hearing        Relevant Orders    Referral to Audiology    Encounter for immunization        Relevant Orders    Flu vaccine, quadrivalent, high-dose, preservative free, age 65y+ (FLUZONE) (Completed)    Pneumococcal conjugate vaccine, 20-valent (PREVNAR 20)

## 2023-12-21 ENCOUNTER — CLINICAL SUPPORT (OUTPATIENT)
Dept: SLEEP MEDICINE | Facility: HOSPITAL | Age: 78
End: 2023-12-21
Payer: MEDICARE

## 2023-12-21 DIAGNOSIS — G47.33 OSA (OBSTRUCTIVE SLEEP APNEA): ICD-10-CM

## 2023-12-21 PROCEDURE — 95806 SLEEP STUDY UNATT&RESP EFFT: CPT | Performed by: STUDENT IN AN ORGANIZED HEALTH CARE EDUCATION/TRAINING PROGRAM

## 2024-01-02 ENCOUNTER — TELEPHONE (OUTPATIENT)
Dept: PRIMARY CARE | Facility: CLINIC | Age: 79
End: 2024-01-02
Payer: MEDICARE

## 2024-01-15 DIAGNOSIS — G47.33 OSA (OBSTRUCTIVE SLEEP APNEA): Primary | ICD-10-CM

## 2024-01-22 ENCOUNTER — OFFICE VISIT (OUTPATIENT)
Dept: SLEEP MEDICINE | Facility: CLINIC | Age: 79
End: 2024-01-22
Payer: MEDICARE

## 2024-01-22 VITALS
WEIGHT: 287 LBS | DIASTOLIC BLOOD PRESSURE: 60 MMHG | BODY MASS INDEX: 40.18 KG/M2 | HEIGHT: 71 IN | OXYGEN SATURATION: 97 % | SYSTOLIC BLOOD PRESSURE: 126 MMHG | HEART RATE: 61 BPM

## 2024-01-22 DIAGNOSIS — G47.33 OBSTRUCTIVE SLEEP APNEA, ADULT: Primary | ICD-10-CM

## 2024-01-22 PROCEDURE — 1159F MED LIST DOCD IN RCRD: CPT | Performed by: INTERNAL MEDICINE

## 2024-01-22 PROCEDURE — 99204 OFFICE O/P NEW MOD 45 MIN: CPT | Performed by: INTERNAL MEDICINE

## 2024-01-22 PROCEDURE — 1126F AMNT PAIN NOTED NONE PRSNT: CPT | Performed by: INTERNAL MEDICINE

## 2024-01-22 PROCEDURE — 1036F TOBACCO NON-USER: CPT | Performed by: INTERNAL MEDICINE

## 2024-01-22 PROCEDURE — 1160F RVW MEDS BY RX/DR IN RCRD: CPT | Performed by: INTERNAL MEDICINE

## 2024-01-22 ASSESSMENT — PAIN SCALES - GENERAL: PAINLEVEL: 0-NO PAIN

## 2024-01-22 NOTE — ASSESSMENT & PLAN NOTE
- The patient has sleep apnea and requires treatment.  - Start  Auto PAP 6-20 cm H20 through [employer].  - Sleep apnea and PAP therapy education was provided at length in clinic today. Qamar  verbalized understanding.  - Diet, exercise, and weight loss were emphasized today in clinic, as were non-supine sleep, avoiding alcohol in the late evening, and driving or operating heavy machinery when sleepy.   -Qamar verbalized understanding.   MITCH Res Med P30i pillows mask  The patient's current CPAP is broken beyond repair. [Qamar Lowe Jr. needs a replacement with remote monitoring capabilities.   CPAP motor has exceeded life expectancy

## 2024-01-22 NOTE — PATIENT INSTRUCTIONS
"  Starting Positive Airway Pressure: You were ordered a device to wear when you sleep called PAP (Positive Airway Pressure) to treat your sleep apnea. The order will be submitted to a durable medical equipment company who will arrange setting you up with the device. They will provide all the necessary equipment and discuss use and maintenance of the device with you.     Please followup with us in 1-2 months of starting PAP to see how well it is working for you or to troubleshoot. Please bring your equipment to this initial followup visit.    **Please bring all PAP equipment with you to follow up appointments unless told otherwise.**     Important things to keep in mind as you start PAP:  Insurance will monitor your usage during the first 90 days.  You should use your PAP - \"all night, every night\", for your health. The bare minimum is to use your PAP device while sleeping = at least 4 hours per day at least 5 days per week. Otherwise, your PAP device may be reclaimed by your PAP vendor at 90 days.  There are many mask to choose from to wear with your PAP machine. If you are not comfortable with the first mask issued to you, call your DME and ask for another option to try. Some have a 30 day return policy.  Discuss with your provider if you are having issues breathing with the machine or the temperature or humidity feel uncomfortable  Expect to have an adjustment period when you start your device. It helps to continuing wearing the machine every day for a period of time until you get more used to it. You can practice with wearing the mask alone if you need, then add in the PAP air pressure a few days later.   Reach out for help if you are struggling! The sleep medicine department can be reached at [phone]  We encourage you to download data monitoring apps to your phone. For Jackpocket AirSupernovase 10/11 - MyAir [employer]iston. For MIGSIF - DreamMapper. Both are available in the [employer] store for free and are a great " tool to monitor your progress with your CPAP night to night.

## 2024-01-22 NOTE — PROGRESS NOTES
Subjective   Patient ID: Qamar Lowe Jr. is a 78 y.o. male who presents for a sleep evaluation with concerns of Sleep Apnea and Needs Pap Supplies/new Pap Machine.  HPI     Prior sleep study results:   HSAT 12/21/2023: AHI 3% 61.1, AHI 4% 44.6, SpO2 dong 80.7    Current history:  The patient's current CPAP is broken beyond repair. [Qamar Lowe Jr. needs a replacement with remote monitoring capabilities.      Sleep schedule  on weekdays / work days:  Usual Bedtime 8 PM  Falls asleep around 8:15 PM  Wake time 3 AM  Total sleep time average is 8 hours/day  Naps  No   Refreshing naps:   No     Sleep schedule  on weekends/non work days :  Usual Bedtime 10 PM  Falls asleep around 10:15 PM  Wake time 7 AM  Total sleep time average is 8 hours/day      Preferred sleeping position: Supine    Review of Systems    nocturia and dry mouth in the morning    SCREENING FOR SLEEP DISORDERS:    MOVEMENT DISORDER SYMPTOMS:    - Sensations: Patient has unusual sensations in their extremities that cause an urge to move them , - Frequency: at night when going to sleep , - Relief: Symptoms ARE/ARENOT: are relieved with movement , - Circadian: Symptoms ARE/ARENOT: are not typically improved later in the night. , - Movement: Patient has not been told that their legs kick or jerk during sleep    DENIES  dreams upon falling asleep  hypnagogic/hypnopompic hallucinations  sleep paralysis  symptoms of cataplexy  sleep walking  kicking, punching, or acting out dreams    ESS 1  RABIA 4  FOSQ 38      Past Medical History:   Diagnosis Date    Encounter for examination of eyes and vision without abnormal findings     Diabetic eye exam      Patient Active Problem List   Diagnosis    Allergic rhinitis    Ambulatory dysfunction    Arthritis of carpometacarpal (CMC) joint of both thumbs    Bilateral thumb pain    Benign prostatic hyperplasia    De Quervain's tenosynovitis, left    Depression    Diabetes mellitus type 2, controlled (CMS/HCC)     Diaphoresis    Edema    Erectile dysfunction    Folic acid deficiency    Hearing loss, bilateral    Hyperlipidemia    Leukopenia    Numbness of toes    Obstructive sleep apnea, adult    Onychomycosis of toenail    Osteoarthritis    Primary localized osteoarthritis of left knee    Radiculitis involving upper extremity    Radiculitis of leg    Rosacea    Trigger finger of left thumb    Vitamin B 12 deficiency    Class 2 obesity with body mass index (BMI) of 39.0 to 39.9 in adult    Class 2 severe obesity due to excess calories with serious comorbidity and body mass index (BMI) of 39.0 to 39.9 in adult (CMS/[facility])    BMI 40.0-44.9, adult (CMS/[facility])      History reviewed. No pertinent surgical history.     Current Outpatient Medications:     fenofibrate (Triglide) 160 mg tablet, TAKE 1 TABLET BY MOUTH DAILY, Disp: 90 tablet, Rfl: 3    fish oil concentrate (Omega-3) 120-180 mg capsule, Take 3 capsules (3 g) by mouth once daily., Disp: , Rfl:     fluticasone (Flonase) 50 mcg/actuation nasal spray, Administer 2 sprays into affected nostril(s) once daily., Disp: , Rfl:     furosemide (Lasix) 40 mg tablet, TAKE 1 TABLET BY MOUTH DAILY, Disp: 100 tablet, Rfl: 2    isosorbide dinitrate (Isordil) 30 mg tablet, Take 1 tablet (30 mg) by mouth 4 times a day., Disp: , Rfl:     isosorbide mononitrate ER (Imdur) 30 mg 24 hr tablet, TAKE 1 TABLET BY MOUTH DAILY, Disp: 100 tablet, Rfl: 2    ONETOUCH DELICA LANCETS MISC, , Disp: , Rfl:     OneTouch Ultra Test strip, TEST 2 TIIMES DAILY AS  DIRECTED, Disp: , Rfl:     pioglitazone (Actos) 30 mg tablet, Take 1 tablet (30 mg) by mouth once daily., Disp: 100 tablet, Rfl: 1    simvastatin (Zocor) 80 mg tablet, TAKE 1 TABLET BY MOUTH DAILY, Disp: 90 tablet, Rfl: 1    tamsulosin (Flomax) 0.4 mg 24 hr capsule, Take 1 capsule (0.4 mg) by mouth once daily., Disp: 90 capsule, Rfl: 1   Allergies   Allergen Reactions    Ketoprofen Unknown    Simvastatin Other    Metronidazole Rash      Social History  "    Socioeconomic History    Marital status:      Spouse name: Not on file    Number of children: Not on file    Years of education: Not on file    Highest education level: Not on file   Occupational History    Not on file   Tobacco Use    Smoking status: Never    Smokeless tobacco: Never   Vaping Use    Vaping Use: Never used   Substance and Sexual Activity    Alcohol use: Yes     Comment: a can of beer every once in a while    Drug use: Never    Sexual activity: Defer   Other Topics Concern    Not on file   Social History Narrative    Not on file     Social Determinants of Health     Financial Resource Strain: Not on file   Food Insecurity: Not on file   Transportation Needs: Not on file   Physical Activity: Not on file   Stress: Not on file   Social Connections: Not on file   Intimate Partner Violence: Not on file   Housing Stability: Not on file      SOCIAL HISTORY : reports alcohol use  denies marijuana use  former smoker, quit 1980  consumes 1 caffeinated beverages/day  Family History   Problem Relation Name Age of Onset    Other (cardiac disorder) Father           No results found for: \"IRON\", \"TIBC\", \"FERRITIN\"     Objective   /60   Pulse 61   Ht 1.803 m (5' 11\")   Wt 130 kg (287 lb)   SpO2 97%   BMI 40.03 kg/m²    Physical Exam  PHYSICAL EXAM: GENERAL: alert pleasant and cooperative no acute distress  nasal mucosa , normal, and pink  MODIFIED BULLARD SCORE: IV  MODIFIED MALLAMPATI SCORE: IV (only hard palate visible)  midline  TONSILLAR SCORE: 0  TONGUE SCALLOPING: enlarged with scalloping midline  NECK EXAM: normal supple no adenopathy  PSYCH EXAM: alert,oriented, in NAD with a full range of affect, normal behavior and no psychotic features  Assessment/Plan   Problem List Items Addressed This Visit             ICD-10-CM    Obstructive sleep apnea, adult - Primary G47.33     - The patient has sleep apnea and requires treatment.  - Start  Auto PAP 6-20 cm H20 through [employer].  - Sleep " apnea and PAP therapy education was provided at length in clinic today. Qamar  verbalized understanding.  - Diet, exercise, and weight loss were emphasized today in clinic, as were non-supine sleep, avoiding alcohol in the late evening, and driving or operating heavy machinery when sleepy.   -Qamar verbalized understanding.   MITCH Res Med P30i pillows mask  The patient's current CPAP is broken beyond repair. [Qamar Lowe Jr. needs a replacement with remote monitoring capabilities.   CPAP motor has exceeded life expectancy         BMI 40.0-44.9, adult (CMS/[facility]) Z68.41     Education on the importance of weight loss in the management of sleep disordered breathing.

## 2024-02-11 DIAGNOSIS — E78.5 HYPERLIPIDEMIA, UNSPECIFIED HYPERLIPIDEMIA TYPE: ICD-10-CM

## 2024-02-13 DIAGNOSIS — E11.9 CONTROLLED TYPE 2 DIABETES MELLITUS WITHOUT COMPLICATION, WITHOUT LONG-TERM CURRENT USE OF INSULIN (MULTI): ICD-10-CM

## 2024-02-13 DIAGNOSIS — E78.5 HYPERLIPIDEMIA, UNSPECIFIED HYPERLIPIDEMIA TYPE: ICD-10-CM

## 2024-02-13 RX ORDER — SIMVASTATIN 80 MG/1
TABLET, FILM COATED ORAL
Qty: 100 TABLET | Refills: 2 | Status: SHIPPED | OUTPATIENT
Start: 2024-02-13

## 2024-02-14 RX ORDER — BLOOD SUGAR DIAGNOSTIC
STRIP MISCELLANEOUS
Qty: 200 STRIP | Refills: 1 | Status: SHIPPED | OUTPATIENT
Start: 2024-02-14

## 2024-02-14 RX ORDER — FENOFIBRATE 160 MG/1
TABLET ORAL
Qty: 100 TABLET | Refills: 1 | Status: SHIPPED | OUTPATIENT
Start: 2024-02-14

## 2024-04-22 ENCOUNTER — APPOINTMENT (OUTPATIENT)
Dept: SLEEP MEDICINE | Facility: CLINIC | Age: 79
End: 2024-04-22
Payer: MEDICARE

## 2024-05-17 ENCOUNTER — LAB (OUTPATIENT)
Dept: LAB | Facility: LAB | Age: 79
End: 2024-05-17
Payer: MEDICARE

## 2024-05-17 DIAGNOSIS — R35.0 INCREASED URINARY FREQUENCY: ICD-10-CM

## 2024-05-17 DIAGNOSIS — E11.8 CONTROLLED TYPE 2 DIABETES MELLITUS WITH COMPLICATION, WITHOUT LONG-TERM CURRENT USE OF INSULIN (MULTI): ICD-10-CM

## 2024-05-17 DIAGNOSIS — E53.8 VITAMIN B 12 DEFICIENCY: ICD-10-CM

## 2024-05-17 LAB
ALBUMIN SERPL BCP-MCNC: 3.5 G/DL (ref 3.4–5)
ALP SERPL-CCNC: 46 U/L (ref 33–136)
ALT SERPL W P-5'-P-CCNC: 12 U/L (ref 10–52)
ANION GAP SERPL CALC-SCNC: 7 MMOL/L (ref 10–20)
AST SERPL W P-5'-P-CCNC: 19 U/L (ref 9–39)
BASOPHILS # BLD AUTO: 0.02 X10*3/UL (ref 0–0.1)
BASOPHILS NFR BLD AUTO: 0.5 %
BILIRUB SERPL-MCNC: 0.6 MG/DL (ref 0–1.2)
BUN SERPL-MCNC: 18 MG/DL (ref 6–23)
CALCIUM SERPL-MCNC: 8.6 MG/DL (ref 8.6–10.6)
CHLORIDE SERPL-SCNC: 109 MMOL/L (ref 98–107)
CO2 SERPL-SCNC: 31 MMOL/L (ref 21–32)
CREAT SERPL-MCNC: 0.86 MG/DL (ref 0.5–1.3)
EGFRCR SERPLBLD CKD-EPI 2021: 87 ML/MIN/1.73M*2
EOSINOPHIL # BLD AUTO: 0.12 X10*3/UL (ref 0–0.4)
EOSINOPHIL NFR BLD AUTO: 3.1 %
ERYTHROCYTE [DISTWIDTH] IN BLOOD BY AUTOMATED COUNT: 14.3 % (ref 11.5–14.5)
GLUCOSE SERPL-MCNC: 91 MG/DL (ref 74–99)
HCT VFR BLD AUTO: 41.4 % (ref 41–52)
HGB BLD-MCNC: 13.5 G/DL (ref 13.5–17.5)
IMM GRANULOCYTES # BLD AUTO: 0.01 X10*3/UL (ref 0–0.5)
IMM GRANULOCYTES NFR BLD AUTO: 0.3 % (ref 0–0.9)
LYMPHOCYTES # BLD AUTO: 0.97 X10*3/UL (ref 0.8–3)
LYMPHOCYTES NFR BLD AUTO: 24.8 %
MCH RBC QN AUTO: 29.8 PG (ref 26–34)
MCHC RBC AUTO-ENTMCNC: 32.6 G/DL (ref 32–36)
MCV RBC AUTO: 91 FL (ref 80–100)
MONOCYTES # BLD AUTO: 0.41 X10*3/UL (ref 0.05–0.8)
MONOCYTES NFR BLD AUTO: 10.5 %
NEUTROPHILS # BLD AUTO: 2.38 X10*3/UL (ref 1.6–5.5)
NEUTROPHILS NFR BLD AUTO: 60.8 %
NRBC BLD-RTO: 0 /100 WBCS (ref 0–0)
PLATELET # BLD AUTO: 240 X10*3/UL (ref 150–450)
POTASSIUM SERPL-SCNC: 4.2 MMOL/L (ref 3.5–5.3)
PROT SERPL-MCNC: 6.1 G/DL (ref 6.4–8.2)
PSA SERPL-MCNC: 1.92 NG/ML
RBC # BLD AUTO: 4.53 X10*6/UL (ref 4.5–5.9)
SODIUM SERPL-SCNC: 143 MMOL/L (ref 136–145)
VIT B12 SERPL-MCNC: 319 PG/ML (ref 211–911)
WBC # BLD AUTO: 3.9 X10*3/UL (ref 4.4–11.3)

## 2024-05-17 PROCEDURE — 80053 COMPREHEN METABOLIC PANEL: CPT

## 2024-05-17 PROCEDURE — 84153 ASSAY OF PSA TOTAL: CPT

## 2024-05-17 PROCEDURE — 85025 COMPLETE CBC W/AUTO DIFF WBC: CPT

## 2024-05-17 PROCEDURE — 36415 COLL VENOUS BLD VENIPUNCTURE: CPT

## 2024-05-17 PROCEDURE — 82607 VITAMIN B-12: CPT

## 2024-05-28 ENCOUNTER — OFFICE VISIT (OUTPATIENT)
Dept: PRIMARY CARE | Facility: CLINIC | Age: 79
End: 2024-05-28
Payer: MEDICARE

## 2024-05-28 ENCOUNTER — HOSPITAL ENCOUNTER (OUTPATIENT)
Dept: RADIOLOGY | Facility: CLINIC | Age: 79
Discharge: HOME | End: 2024-05-28
Payer: MEDICARE

## 2024-05-28 VITALS
RESPIRATION RATE: 17 BRPM | SYSTOLIC BLOOD PRESSURE: 124 MMHG | HEART RATE: 57 BPM | WEIGHT: 279.21 LBS | DIASTOLIC BLOOD PRESSURE: 60 MMHG | HEIGHT: 71 IN | BODY MASS INDEX: 39.09 KG/M2 | OXYGEN SATURATION: 97 %

## 2024-05-28 DIAGNOSIS — M25.561 CHRONIC PAIN OF RIGHT KNEE: ICD-10-CM

## 2024-05-28 DIAGNOSIS — M79.89 LEG SWELLING: ICD-10-CM

## 2024-05-28 DIAGNOSIS — E78.2 MIXED HYPERLIPIDEMIA: Primary | ICD-10-CM

## 2024-05-28 DIAGNOSIS — I73.9 CLAUDICATION (CMS-HCC): ICD-10-CM

## 2024-05-28 DIAGNOSIS — G89.29 CHRONIC PAIN OF RIGHT KNEE: ICD-10-CM

## 2024-05-28 DIAGNOSIS — E11.9 CONTROLLED TYPE 2 DIABETES MELLITUS WITHOUT COMPLICATION, WITHOUT LONG-TERM CURRENT USE OF INSULIN (MULTI): ICD-10-CM

## 2024-05-28 LAB — POC HEMOGLOBIN A1C: 5.8 % (ref 4.2–6.5)

## 2024-05-28 PROCEDURE — 1123F ACP DISCUSS/DSCN MKR DOCD: CPT | Performed by: STUDENT IN AN ORGANIZED HEALTH CARE EDUCATION/TRAINING PROGRAM

## 2024-05-28 PROCEDURE — 1160F RVW MEDS BY RX/DR IN RCRD: CPT | Performed by: STUDENT IN AN ORGANIZED HEALTH CARE EDUCATION/TRAINING PROGRAM

## 2024-05-28 PROCEDURE — 1159F MED LIST DOCD IN RCRD: CPT | Performed by: STUDENT IN AN ORGANIZED HEALTH CARE EDUCATION/TRAINING PROGRAM

## 2024-05-28 PROCEDURE — 1157F ADVNC CARE PLAN IN RCRD: CPT | Performed by: STUDENT IN AN ORGANIZED HEALTH CARE EDUCATION/TRAINING PROGRAM

## 2024-05-28 PROCEDURE — 1036F TOBACCO NON-USER: CPT | Performed by: STUDENT IN AN ORGANIZED HEALTH CARE EDUCATION/TRAINING PROGRAM

## 2024-05-28 PROCEDURE — 73562 X-RAY EXAM OF KNEE 3: CPT | Mod: RT

## 2024-05-28 PROCEDURE — 83036 HEMOGLOBIN GLYCOSYLATED A1C: CPT | Performed by: STUDENT IN AN ORGANIZED HEALTH CARE EDUCATION/TRAINING PROGRAM

## 2024-05-28 PROCEDURE — 73562 X-RAY EXAM OF KNEE 3: CPT | Mod: RIGHT SIDE | Performed by: RADIOLOGY

## 2024-05-28 PROCEDURE — 99215 OFFICE O/P EST HI 40 MIN: CPT | Performed by: STUDENT IN AN ORGANIZED HEALTH CARE EDUCATION/TRAINING PROGRAM

## 2024-05-28 ASSESSMENT — PATIENT HEALTH QUESTIONNAIRE - PHQ9
2. FEELING DOWN, DEPRESSED OR HOPELESS: NOT AT ALL
SUM OF ALL RESPONSES TO PHQ9 QUESTIONS 1 AND 2: 0
1. LITTLE INTEREST OR PLEASURE IN DOING THINGS: NOT AT ALL

## 2024-05-28 NOTE — PROGRESS NOTES
Subjective   Patient ID: Qamar Lowe Jr. is a 79 y.o. male who presents for Follow-up (Pt is here for a 6 MO DM follow up.).    HPI  Continued leg swelling bilaterally   Trouble getting up and down streps due to the heaviness of his legs   Pain at the end of the day withy walking in the legs and knee   Tried compression without much success  Tried lasix 40 mg daily without success     Objective   Physical Exam  Vitals reviewed.   Constitutional:       Appearance: Normal appearance.   Cardiovascular:      Rate and Rhythm: Normal rate and regular rhythm.      Heart sounds: No murmur heard.  Pulmonary:      Effort: Pulmonary effort is normal. No respiratory distress.      Breath sounds: Normal breath sounds. No wheezing.   Musculoskeletal:      Cervical back: Neck supple.      Left lower leg: No edema.        Legs:       Comments: Pain on the lateral and medial joint line   No noted effusion on the right knee     Right lower leg: +1 pitting edema    Neurological:      Mental Status: He is alert.       Assessment/Plan   Problem List Items Addressed This Visit             ICD-10-CM    Diabetes mellitus type 2, controlled (Multi) E11.9    Relevant Orders    POCT glycosylated hemoglobin (Hb A1C) manually resulted (Completed)    CBC and Auto Differential    Comprehensive metabolic panel    Lipid Panel    Albumin , Urine Random    Hyperlipidemia - Primary E78.5    Relevant Orders    Lipid Panel     Other Visit Diagnoses         Codes    Claudication (CMS-[facility])     I73.9    Relevant Orders    Vascular US PVR with exercise    Leg swelling     M79.89    Relevant Orders    Vascular US PVR with exercise    Chronic pain of right knee     M25.561, G89.29    Relevant Orders    XR knee right 3 views          Qamar Lowe for 79 year old female who presents to the office for 6 month follow up and medication refills.      DM  IO A1C 5.8  refilled Pioglitazone 30mg daily .   Legs continue to have moderate swelling, mildly reduced  purvi Jackson   reviewed his logs- to be scanned into the chart   eye exam is UTD   We reviewed healthy lifestyle choices and changes. Advised to avoid fatty foods such as processed food, sweets; avoid foods heavy in sugar and salt. Maintain regular exercise 20 min daily or a total of 120 min weekly of moderate exercise.     HTN  BP was good at 124/60mmHg  Physical exam was stable.   refilled isosorbide 30mg daily   Discussed maintaining diets such as DASH to help maintain normal Blood pressure. Encouraged regular exercise for a total of 120min weekly.     Right knee pain  Pain along the bilateral joint line suspected osteoarthritis  Right knee x-ray ordered     OA  CMC joint arthritis   following with ortho     DSL  continue fenofibrate 160mg daily   continue simvastatin 80mg      Leg Swelling  refilled lasix 40mg daily      Leukopenia   persistent leukopenia   plan for heme Intervention if continues     Health Maintenance  Vaccines: influenza vaccines given  follow up in 3 months for DM fu     Health Maintenance   Topic Date Due    Diabetes: Foot Exam  Never done    Diabetes: Retinopathy Screening  Never done    Zoster Vaccines (1 of 2) Never done    RSV Pregnant patients and/or  patients aged 60+ years (1 - 1-dose 60+ series) Never done    Diabetes: Urine Protein Screening  05/21/2022    COVID-19 Vaccine (4 - 2023-24 season) 09/01/2023    Diabetes: Hemoglobin A1C  08/28/2024    Lipid Panel  11/21/2024    Medicare Annual Wellness Visit (AWV)  11/29/2024    DTaP/Tdap/Td Vaccines (2 - Td or Tdap) 03/05/2033    Influenza Vaccine  Completed    Pneumococcal Vaccine: 65+ Years  Completed    HIB Vaccines  Aged Out    Hepatitis B Vaccines  Aged Out    IPV Vaccines  Aged Out    Hepatitis A Vaccines  Aged Out    Meningococcal Vaccine  Aged Out    Rotavirus Vaccines  Aged Out    HPV Vaccines  Aged Out               María Elena Scott DO 05/28/24 10:50 AM

## 2024-05-30 ENCOUNTER — HOSPITAL ENCOUNTER (OUTPATIENT)
Dept: VASCULAR MEDICINE | Facility: CLINIC | Age: 79
Discharge: HOME | End: 2024-05-30
Payer: MEDICARE

## 2024-05-30 DIAGNOSIS — I73.9 CLAUDICATION (CMS-HCC): ICD-10-CM

## 2024-05-30 DIAGNOSIS — M79.89 LEG SWELLING: ICD-10-CM

## 2024-05-30 PROCEDURE — 93922 UPR/L XTREMITY ART 2 LEVELS: CPT

## 2024-05-30 PROCEDURE — 93922 UPR/L XTREMITY ART 2 LEVELS: CPT | Performed by: INTERNAL MEDICINE

## 2024-06-15 DIAGNOSIS — E11.8 CONTROLLED TYPE 2 DIABETES MELLITUS WITH COMPLICATION, WITHOUT LONG-TERM CURRENT USE OF INSULIN (MULTI): ICD-10-CM

## 2024-06-17 RX ORDER — PIOGLITAZONEHYDROCHLORIDE 30 MG/1
30 TABLET ORAL DAILY
Qty: 90 TABLET | Refills: 1 | Status: SHIPPED | OUTPATIENT
Start: 2024-06-17

## 2024-07-23 ENCOUNTER — TELEPHONE (OUTPATIENT)
Dept: PRIMARY CARE | Facility: CLINIC | Age: 79
End: 2024-07-23
Payer: MEDICARE

## 2024-07-25 ENCOUNTER — HOSPITAL ENCOUNTER (OUTPATIENT)
Dept: RADIOLOGY | Facility: CLINIC | Age: 79
Discharge: HOME | End: 2024-07-25
Payer: MEDICARE

## 2024-07-25 ENCOUNTER — OFFICE VISIT (OUTPATIENT)
Dept: PRIMARY CARE | Facility: CLINIC | Age: 79
End: 2024-07-25
Payer: MEDICARE

## 2024-07-25 VITALS
WEIGHT: 283 LBS | DIASTOLIC BLOOD PRESSURE: 67 MMHG | HEART RATE: 65 BPM | BODY MASS INDEX: 39.62 KG/M2 | HEIGHT: 71 IN | OXYGEN SATURATION: 96 % | SYSTOLIC BLOOD PRESSURE: 104 MMHG

## 2024-07-25 DIAGNOSIS — R19.7 DIARRHEA, UNSPECIFIED TYPE: ICD-10-CM

## 2024-07-25 DIAGNOSIS — E53.8 VITAMIN B 12 DEFICIENCY: ICD-10-CM

## 2024-07-25 DIAGNOSIS — R60.0 LOCALIZED EDEMA: ICD-10-CM

## 2024-07-25 DIAGNOSIS — R29.898 WEAKNESS OF BOTH LOWER EXTREMITIES: ICD-10-CM

## 2024-07-25 DIAGNOSIS — R29.898 WEAKNESS OF BOTH LOWER EXTREMITIES: Primary | ICD-10-CM

## 2024-07-25 PROCEDURE — 1160F RVW MEDS BY RX/DR IN RCRD: CPT | Performed by: NURSE PRACTITIONER

## 2024-07-25 PROCEDURE — 72110 X-RAY EXAM L-2 SPINE 4/>VWS: CPT

## 2024-07-25 PROCEDURE — 99214 OFFICE O/P EST MOD 30 MIN: CPT | Performed by: NURSE PRACTITIONER

## 2024-07-25 PROCEDURE — 1036F TOBACCO NON-USER: CPT | Performed by: NURSE PRACTITIONER

## 2024-07-25 PROCEDURE — 72110 X-RAY EXAM L-2 SPINE 4/>VWS: CPT | Performed by: RADIOLOGY

## 2024-07-25 PROCEDURE — 1123F ACP DISCUSS/DSCN MKR DOCD: CPT | Performed by: NURSE PRACTITIONER

## 2024-07-25 PROCEDURE — 1159F MED LIST DOCD IN RCRD: CPT | Performed by: NURSE PRACTITIONER

## 2024-07-25 PROCEDURE — 1157F ADVNC CARE PLAN IN RCRD: CPT | Performed by: NURSE PRACTITIONER

## 2024-07-25 NOTE — PROGRESS NOTES
"Subjective   Patient ID: Qamar Lowe Jr. is a 79 y.o. male who presents for Edema and Diarrhea (Patient mentioned that he has had diarrhea for the past month. ).    79 year old male here for acute visit.   He was last seen by his PCP in May he has had ongoing bilateral lower extremity edema he is on diuretics it is not helping he is having a lot of lower extremity weakness.  He states that he feels his legs are getting give out while walking he is very fearful of falling.  PVRs reviewed and negative  His other concern is for loose stools for a little over a month.  Sometimes he has a difficult time making it to the toilet no abdominal pain or cramping stools are brown.  Has been using Pepto-Bismol OTC.  He did not take the Lasix for couple days and noticed a decrease in the stools but his legs continue to swell.   Meds reviewed he is on Actos  Labs reviewed-his protein levels are low his albumin levels are low normal.  This appears to be chronic.  We talked about diet he has been eating a lot better fruits and vegetables I told him about a recent outbreak of Listeria on fruits and vegetables from local stores his wife had diarrhea for approximately 3 days then resolved.   He visits the nursing home regularly and wonders if he came into contact with something there.         Review of Systems    Objective   /67   Pulse 65   Ht 1.803 m (5' 11\")   Wt 128 kg (283 lb)   SpO2 96%   BMI 39.47 kg/m²     Physical Exam  Constitutional:       Appearance: Normal appearance. He is obese.   Cardiovascular:      Rate and Rhythm: Normal rate.   Abdominal:      General: Abdomen is flat. Bowel sounds are normal. There is no distension.      Palpations: Abdomen is soft.      Tenderness: There is no abdominal tenderness.   Musculoskeletal:      Right lower leg: Edema (+4 edema) present.      Left lower leg: Edema (+4 edema) present.   Skin:     General: Skin is warm and dry.   Neurological:      General: No focal deficit " "present.      Mental Status: He is alert and oriented to person, place, and time.   Psychiatric:         Mood and Affect: Mood normal.         Behavior: Behavior normal.         Thought Content: Thought content normal.         Assessment/Plan   Diagnoses and all orders for this visit:  Weakness of both lower extremities  Comments:  Xray lumbar spine. HX \"bulging disc\". may need therapy vs ortho/spine  Orders:  -     XR lumbar spine 2-3 views; Future  Diarrhea, unspecified type  Comments:  mult possible causes. check stool for c-diff adn other bacteria. Add meamucil daily until results available  Orders:  -     Stool Pathogen Panel, PCR; Future  -     C. difficile, PCR; Future  Vitamin B 12 deficiency  Comments:  start B 12 1000mg SL qd  Localized edema  Comments:  many poss causes D/w pt. his protein levels are too low. This can cause third spacing. Pt encouraged to increase the protein in his diet.    I am out of the office for the next week. I reviewed this with the pt. I will forward this note to the provider covering me.      "

## 2024-07-25 NOTE — PATIENT INSTRUCTIONS
B12 1000mcg sublingual daily in the morning  Increase your protein- goal is 50% body weight in protein- no lunch meat!  Nuts, grains, beans, seeds, chicken, egg, cheese, steak  Metamucil daily

## 2024-07-26 ENCOUNTER — LAB (OUTPATIENT)
Dept: LAB | Facility: LAB | Age: 79
End: 2024-07-26
Payer: MEDICARE

## 2024-07-26 DIAGNOSIS — R19.7 DIARRHEA, UNSPECIFIED TYPE: ICD-10-CM

## 2024-07-26 PROCEDURE — 87506 IADNA-DNA/RNA PROBE TQ 6-11: CPT

## 2024-07-26 PROCEDURE — 87493 C DIFF AMPLIFIED PROBE: CPT

## 2024-07-27 LAB
C COLI+JEJ+UPSA DNA STL QL NAA+PROBE: NOT DETECTED
C DIF TOX TCDA+TCDB STL QL NAA+PROBE: NOT DETECTED
EC STX1 GENE STL QL NAA+PROBE: NOT DETECTED
EC STX2 GENE STL QL NAA+PROBE: NOT DETECTED
NOROVIRUS GI + GII RNA STL NAA+PROBE: NOT DETECTED
RV RNA STL NAA+PROBE: NOT DETECTED
SALMONELLA DNA STL QL NAA+PROBE: NOT DETECTED
SHIGELLA DNA SPEC QL NAA+PROBE: NOT DETECTED
V CHOLERAE DNA STL QL NAA+PROBE: NOT DETECTED
Y ENTEROCOL DNA STL QL NAA+PROBE: NOT DETECTED

## 2024-07-31 ENCOUNTER — TELEPHONE (OUTPATIENT)
Dept: PRIMARY CARE | Facility: CLINIC | Age: 79
End: 2024-07-31
Payer: MEDICARE

## 2024-07-31 DIAGNOSIS — R29.898 WEAKNESS OF BOTH LOWER EXTREMITIES: Primary | ICD-10-CM
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
## 2024-07-31 DIAGNOSIS — E78.5 HYPERLIPIDEMIA, UNSPECIFIED HYPERLIPIDEMIA TYPE: ICD-10-CM

## 2024-07-31 DIAGNOSIS — M54.16 LUMBAR RADICULOPATHY: ICD-10-CM

## 2024-07-31 DIAGNOSIS — Z00.00 HEALTHCARE MAINTENANCE: ICD-10-CM

## 2024-07-31 DIAGNOSIS — R60.9 EDEMA, UNSPECIFIED TYPE: ICD-10-CM

## 2024-07-31 DIAGNOSIS — M47.16 LUMBAR SPONDYLOSIS WITH MYELOPATHY: ICD-10-CM

## 2024-07-31 RX ORDER — FENOFIBRATE 160 MG/1
TABLET ORAL
Qty: 90 TABLET | Refills: 1 | Status: SHIPPED | OUTPATIENT
Start: 2024-07-31

## 2024-08-02 RX ORDER — ISOSORBIDE MONONITRATE 30 MG/1
TABLET, EXTENDED RELEASE ORAL
Qty: 90 TABLET | Refills: 2 | Status: SHIPPED | OUTPATIENT
Start: 2024-08-02

## 2024-08-02 RX ORDER — FUROSEMIDE 40 MG/1
TABLET ORAL
Qty: 90 TABLET | Refills: 1 | Status: SHIPPED | OUTPATIENT
Start: 2024-08-02

## 2024-08-24 DIAGNOSIS — E11.8 CONTROLLED TYPE 2 DIABETES MELLITUS WITH COMPLICATION, WITHOUT LONG-TERM CURRENT USE OF INSULIN (MULTI): ICD-10-CM

## 2024-08-26 RX ORDER — PIOGLITAZONEHYDROCHLORIDE 30 MG/1
30 TABLET ORAL DAILY
Qty: 100 TABLET | Refills: 2 | Status: SHIPPED | OUTPATIENT
Start: 2024-08-26

## 2024-08-30 ENCOUNTER — APPOINTMENT (OUTPATIENT)
Dept: ORTHOPEDIC SURGERY | Facility: CLINIC | Age: 79
End: 2024-08-30
Payer: MEDICARE

## 2024-08-30 VITALS — HEIGHT: 71 IN | WEIGHT: 285 LBS | BODY MASS INDEX: 39.9 KG/M2

## 2024-08-30 DIAGNOSIS — R29.898 WEAKNESS OF BOTH LOWER EXTREMITIES: ICD-10-CM

## 2024-08-30 DIAGNOSIS — M48.061 SPINAL STENOSIS OF LUMBAR REGION, UNSPECIFIED WHETHER NEUROGENIC CLAUDICATION PRESENT: ICD-10-CM

## 2024-08-30 DIAGNOSIS — M47.16 LUMBAR SPONDYLOSIS WITH MYELOPATHY: ICD-10-CM
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
## 2024-08-30 PROCEDURE — 1159F MED LIST DOCD IN RCRD: CPT | Performed by: ORTHOPAEDIC SURGERY

## 2024-08-30 PROCEDURE — 1157F ADVNC CARE PLAN IN RCRD: CPT | Performed by: ORTHOPAEDIC SURGERY

## 2024-08-30 PROCEDURE — 1123F ACP DISCUSS/DSCN MKR DOCD: CPT | Performed by: ORTHOPAEDIC SURGERY

## 2024-08-30 PROCEDURE — 99203 OFFICE O/P NEW LOW 30 MIN: CPT | Performed by: ORTHOPAEDIC SURGERY

## 2024-08-30 ASSESSMENT — PAIN - FUNCTIONAL ASSESSMENT: PAIN_FUNCTIONAL_ASSESSMENT: 0-10

## 2024-08-30 NOTE — LETTER
"September 3, 2024     María lEena Scott DO  [address]  [address]  [facility] [address]    Patient: Qamar Lowe Jr.   YOB: 1945   Date of Visit: 8/30/2024       Dear Dr. María Elena Scott DO:    Thank you for referring Qamar Lowe to me for evaluation. Below are my notes for this consultation.  If you have questions, please do not hesitate to call me. I look forward to following your patient along with you.       Sincerely,     Rui Garrido MD      CC: Nayana Sol, APRN-CNP  ______________________________________________________________________________________    HPI:Qamar Lowe Jr. is an 79-year-old man who comes in today with a 4-month history of problems with his legs.  He says \"my legs are shot\".  He has a hard time doing stairs.  Sometimes he feels like his legs are swollen.  He has had 2 falls recently.  He also admits to some problems with coordination.  He has not had any nonoperative management.      ROS:  Reviewed on EMR and patient intake sheet.    PMH/SH:  Reviewed on EMR and patient intake sheet.    Exam:  Physical Exam    Constitutional: Well appearing; no acute distress  Eyes: pupils are equal and round  Psych: normal affect  Respiratory: non-labored breathing  Cardiovascular: regular rate and rhythm  GI: non-distended abdomen  Musculoskeletal: no pain with range of motion of the hips bilaterally  Neurologic: [4+]/5 strength in the lower extremities bilaterally]; [negative] straight leg raise    Radiology:     X-rays lumbar spine demonstrate L3-4 spondylolisthesis and multilevel spondylosis L3-S1.    Diagnosis:    Degenerative spondylolisthesis; neurogenic claudication    Assessment and Plan:   79-year-old man with difficulty walking secondary to weakness in the lower extremities which is multifactorial.  He may certainly have a component of lumbar stenosis and neurogenic claudication.  However, the patient may also have some cervical myelopathy.  To this end we will begin " physical therapy for the cervical and lumbar spine.  If the symptoms do not improve, then an MRI of the cervical and lumbar spine and follow-up would be appropriate.  I have advised him to follow-up with his primary care team in regards to his leg swelling which is not related to his lumbar spine.  I will see him back after the MRIs as needed.    The patient was in agreement with the plan. At the end of the visit today, the patient felt that all questions had been answered satisfactorily.  The patient was pleased with the visit and very appreciative for the care rendered.     Thank you very much for the kind referral.  It is a privilege, and a pleasure, to partner with you in the care of your patients.  I would be delighted to assist you with any further consultations as needed.          Rui Garrido MD    Chief of Spine Surgery, [facility]  Director of Spine Service, [facility]  , Department of Orthopaedics  [facility] [employer]  [address] [place] [address]  P: [phone]  [facility]    This note was dictated with voice recognition software.  It has not been proofread for grammatical errors, typographical mistakes or other semantic inconsistencies.

## 2024-09-03 NOTE — PROGRESS NOTES
"HPI:Qamar Lowe Jr. is an 79-year-old man who comes in today with a 4-month history of problems with his legs.  He says \"my legs are shot\".  He has a hard time doing stairs.  Sometimes he feels like his legs are swollen.  He has had 2 falls recently.  He also admits to some problems with coordination.  He has not had any nonoperative management.      ROS:  Reviewed on EMR and patient intake sheet.    PMH/SH:  Reviewed on EMR and patient intake sheet.    Exam:  Physical Exam    Constitutional: Well appearing; no acute distress  Eyes: pupils are equal and round  Psych: normal affect  Respiratory: non-labored breathing  Cardiovascular: regular rate and rhythm  GI: non-distended abdomen  Musculoskeletal: no pain with range of motion of the hips bilaterally  Neurologic: [4+]/5 strength in the lower extremities bilaterally]; [negative] straight leg raise    Radiology:     X-rays lumbar spine demonstrate L3-4 spondylolisthesis and multilevel spondylosis L3-S1.    Diagnosis:    Degenerative spondylolisthesis; neurogenic claudication    Assessment and Plan:   79-year-old man with difficulty walking secondary to weakness in the lower extremities which is multifactorial.  He may certainly have a component of lumbar stenosis and neurogenic claudication.  However, the patient may also have some cervical myelopathy.  To this end we will begin physical therapy for the cervical and lumbar spine.  If the symptoms do not improve, then an MRI of the cervical and lumbar spine and follow-up would be appropriate.  I have advised him to follow-up with his primary care team in regards to his leg swelling which is not related to his lumbar spine.  I will see him back after the MRIs as needed.    The patient was in agreement with the plan. At the end of the visit today, the patient felt that all questions had been answered satisfactorily.  The patient was pleased with the visit and very appreciative for the care rendered.     Thank you " very much for the kind referral.  It is a privilege, and a pleasure, to partner with you in the care of your patients.  I would be delighted to assist you with any further consultations as needed.          Rui Garrido MD    Chief of Spine Surgery, [facility]  Director of Spine Service, [facility]  , Department of Orthopaedics  [facility]  [address] [place] [id number]  P: [phone]  [facility]    This note was dictated with voice recognition software.  It has not been proofread for grammatical errors, typographical mistakes or other semantic inconsistencies.

## 2024-09-10 ENCOUNTER — EVALUATION (OUTPATIENT)
Dept: PHYSICAL THERAPY | Facility: CLINIC | Age: 79
End: 2024-09-10
Payer: MEDICARE

## 2024-09-10 DIAGNOSIS — R29.898 LEG WEAKNESS: ICD-10-CM

## 2024-09-10 DIAGNOSIS — M48.061 SPINAL STENOSIS OF LUMBAR REGION, UNSPECIFIED WHETHER NEUROGENIC CLAUDICATION PRESENT: ICD-10-CM

## 2024-09-10 DIAGNOSIS — M54.50 LOW BACK PAIN: Primary | ICD-10-CM

## 2024-09-10 PROCEDURE — 97110 THERAPEUTIC EXERCISES: CPT | Mod: GP

## 2024-09-10 PROCEDURE — 97162 PT EVAL MOD COMPLEX 30 MIN: CPT | Mod: GP

## 2024-09-10 ASSESSMENT — ENCOUNTER SYMPTOMS
LOSS OF SENSATION IN FEET: 0
DEPRESSION: 0
OCCASIONAL FEELINGS OF UNSTEADINESS: 1

## 2024-09-10 NOTE — PROGRESS NOTES
Physical Therapy Evaluation     Patient Name: Qamar Lowe Jr.  MRN: [id number]  Today's Date: 9/10/2024  Time Calculation  Start Time: 1253  Stop Time: 1349  Time Calculation (min): 56 min      Insurance:  Number of Treatments Authorized: 1/?? (20 COPAY AUTH IS REQUIRED V MED NEC OOP 3500)          Current Problem:   1. Low back pain  Follow Up In Physical Therapy      2. Spinal stenosis of lumbar region, unspecified whether neurogenic claudication present  Referral to Physical Therapy    Follow Up In Physical Therapy      3. Leg weakness  Follow Up In Physical Therapy          Precautions:  Precautions  Precautions Comment: Mod fall risk (Steadi score = 8)      Reason for visit: LBP and B LE instability/balance issues   Referred by: Dr. Garrido     Work, mechanical stresses: Part time standing/walking for 7 hours.  Works in a [employer].    Leisure, mechanical stresses: Bowling - had more difficulty on approach so he stopped.  Watching grandchildren, spend time with family.    Prior to onset the pt was able to complete all work/leisure/functional activities without limitation.  Functional disability from present episode/Primary goal for PT: Difficulty getting up from sitting and unable to climb stairs - weakness.  Feels unstable on slippery/unstable surfaces.  Unable to carry objects/bags/packages while walking. Goal is to improved leg strength and stability on his feet  Present symptoms: 0/10   Present since: 4 months ago and unchanging.  -B LE edema/swelling started approx 1.5 years ago.  Commenced no apparent reason  Symptoms at onset: unsteadiness/fell   Constant symptoms: none  Intermittent symptoms: LBP, B leg cramping and weakness  Pain ranges from: 0-7/10  Pt describes pain as: tooth ache  Symptoms are worse with: bending - no pain but unstable/weak, rising - always,  - always, am - LBP always, as the day progresses at work legs get weak - always  Symptoms are better with:  sitting- always , lying supine, am  as he moves around sx resolve. Walking - always  Disturbed sleep: x 1-2 a night right thigh when he rolls over at night.   Previous episodes: yes intermittent/episodic LBP for over 20 years.   Previous treatments: None  Imaging: Had ultrasound for veins in the legs a few months ago - normal.   Bladder: urgency no retention  Red Flags: reports that he drops things frequently which is a newer issues - has told the doctor's about this,  recent/major surgery - no , night pain - no , accidents - yes, unexplained weight loss - no  Medical history: L knee replacement in 2017, Vascular disease, Diabetes - well controled, Anemia.        Objective Findings:   Outcome Measures:  Other Measures  Oswestry Disablity Index (SAMUEL): = 44%  Posture: Fair  Correction: produce LBP, NW  Lateral shift: Min left shift     Neuro screen:    Myotomes: Normal B L3-S1  Dermatomes: Normal B L3-S1    Lumbar Spine Movement Loss - Nil/Min/Mod/Max limit or degrees  Flexion: min - ERP  Extension: min - ERP  R SGIS: nil  L SGIS: nil    Repeated Movement Testing -  During/after/mechanical response:  Sx in standin/10  RFISitting: x 10- Increase LBP and produce right thigh cramp, NW  ANJANA over plinth x 10  - P LBP  Sx in lying: L knee cramping - did not rate  RFIL: 10 x 2  - decrease left knee cramping   Prone to/from FAIZA x 3 min - cramping in back, decreasing knee, W with ROM check   REIL: 5 x 3 - Decrease cramping, produce ERP, Better with ROM check   REIL in R shift 5 x 2 - Produce, decreasing, better    Treatment:   REIL in R shift x 5  Sitting with roll x 2 min   Educated pt on proper response to exercise, centralization/localization, produce/increase - NW principle, and assessment process.  Issued handout for HEP  HEP/med bridge:   Access Code: [email or web address]  URL: [email or web address]  Date: 09/10/2024  Prepared by: Frandy Lopez  Exercises  - Prone Press Up in R shift  - 5-8 x daily - 7 x weekly - 1 sets - 10-20 reps  -  Seated Correct Posture     Assessment: Pt presents to PT with complaint of LBP and B LE instability/frequent falls that began approx 4 months ago without LONNIE.  Pt's history and response to repeated movements makes provisional classification L/S Right posterior lateral derangement . Pt will continue to be assessed over the next 3-5 sessions to confirm provisional classification and DP. Pt will benefit from skilled PT to address ROM/strength/functional limitations and pain noted during evaluation today.    Plan:  Problem List: Pain, Functional limitations, activity limitations, ROM limitations, Posture, Gait, Transfer, Activity Limitations, IADLS/ADLS/Self care  Goals:  STG:  Pain = 0-3/10 LBP and >/= 50 % reduction in leg cramping by week 4  Pt will demo proper position/use of lumbar roll by week 1  Pt will be compliant with HEP by week 1   Pt will be able to get up from a chair without assist from UEs by week 4  LTG:  L/S ROM = nil limit in all directions by week 8  Pt will achieve a clinically significant improvement in SAMUEL by week 8  Pt will report >/= 80% improvement/progress towards PT goals by week 8  Pt will be able to sleep >/= 4 nights a week without interuuption a night from R thigh crmaping by week 8  Pt will be able to amb on unstable surfaces without LOB by week 10  Pt will be able to go up/down full flight of stairs reciprocal with min UE assist by week 10  -Planned interventions:Ther ex, Neuromuscular re-ed, ther act, Manual, Education, Home program, Estim, Hot therapy, Cold therapy, Vaso  The POC was created, discussed, and agreed on with the patient.

## 2024-09-16 ENCOUNTER — APPOINTMENT (OUTPATIENT)
Dept: PHYSICAL THERAPY | Facility: CLINIC | Age: 79
End: 2024-09-16
Payer: MEDICARE

## 2024-09-24 ENCOUNTER — TREATMENT (OUTPATIENT)
Dept: PHYSICAL THERAPY | Facility: CLINIC | Age: 79
End: 2024-09-24
Payer: MEDICARE

## 2024-09-24 DIAGNOSIS — R29.898 WEAKNESS OF BOTH LOWER EXTREMITIES: ICD-10-CM

## 2024-09-24 DIAGNOSIS — M48.061 SPINAL STENOSIS OF LUMBAR REGION, UNSPECIFIED WHETHER NEUROGENIC CLAUDICATION PRESENT: ICD-10-CM

## 2024-09-24 DIAGNOSIS — M54.50 LOW BACK PAIN: Primary | ICD-10-CM

## 2024-09-24 DIAGNOSIS — R29.898 LEG WEAKNESS: ICD-10-CM

## 2024-09-24 PROCEDURE — 97110 THERAPEUTIC EXERCISES: CPT | Mod: GP,CQ

## 2024-09-24 PROCEDURE — 97140 MANUAL THERAPY 1/> REGIONS: CPT | Mod: GP,CQ

## 2024-09-24 PROCEDURE — 97530 THERAPEUTIC ACTIVITIES: CPT | Mod: GP,CQ

## 2024-09-24 ASSESSMENT — PAIN SCALES - GENERAL: PAINLEVEL_OUTOF10: 5 - MODERATE PAIN

## 2024-09-24 ASSESSMENT — PAIN - FUNCTIONAL ASSESSMENT: PAIN_FUNCTIONAL_ASSESSMENT: 0-10

## 2024-09-24 NOTE — PROGRESS NOTES
Physical Therapy Treatment    Patient Name: Qamar Lowe Jr.  MRN: [id number]  Today's Date: 9/24/2024  Time Calculation  Start Time: 1315  Stop Time: 1402  Time Calculation (min): 47 min  PT Therapeutic Procedures Time Entry  Manual Therapy Time Entry: 12  Therapeutic Exercise Time Entry: 20  Therapeutic Activity Time Entry: 15,      Current Problem  Problem List Items Addressed This Visit    None  Visit Diagnoses         Codes    Low back pain    -  Primary M54.50    Spinal stenosis of lumbar region, unspecified whether neurogenic claudication present     M48.061    Weakness of both lower extremities     R29.898    Leg weakness     R29.898            Insurance:  Number of Treatments Authorized: 2/8  Certification Period Start Date: 09/10/24  Certification Period End Date: 10/08/23      Subjective   General  General Comment: Patient was unable to get on/off the floor to perform HEP.  Has been performing in the bed - feels different.  Feels like his leg cramps are getting worse.  Last night 3x had to get out of bed due to cramping. Back pain is very infrequent.    Performing HEP?: Yes - in the bed. Unable to get on/off the floor    Precautions  Precautions  Precautions Comment: Mod fall risk (Steadi score = 8)  Pain  Pain Assessment: 0-10  0-10 (Numeric) Pain Score: 5 - Moderate pain  Pain Location: Leg  Pain Orientation: Right, Inner    Objective       Treatments:         Manual Therapy  Manual Therapy Activity 1: STM R>L l-para, QL  Manual Therapy Activity 2: Xhand R LAS release  Manual Therapy Activity 3: gentle LS PA mobs    Therapeutic Activity  Therapeutic Activity 1: sitting with lumbar roll x 3 min  Therapeutic Activity 2: L SL with pillow between knees to simulate sleeping position x 5 min - reduced LE sx, very mild LBP  Therapeutic Activity 3: sit to stand from elevated plinth 2 x 5                OP EDUCATION:  Outpatient Education  Education Comment: Access Code: [email or web address]  URL:  [email or web address]  Date: 09/24/2024  Prepared by: Viji Pitts    Exercises  - Bent Knee Fallouts  - 1 x daily - 7 x weekly - 10 reps  - Hooklying Gluteal Sets  - 1 x daily - 7 x weekly - 10 reps - 5 hold  - Small Range Straight Leg Raise  - 1 x daily - 7 x weekly - 2 sets - 5 reps  - Sit to Stand  - 1 x daily - 7 x weekly - 3 sets - 5 reps    Assessment:  PT Assessment  Assessment Comment: Today's session focused on spinal support and alignment in various positions to reduce sx.  Today's patient's LB sx were isolated to his right side.  Patient felt much looser after manual activities and able to perform supint-sit-stand transfer without pain.  Added gentle strengthening exercises to perform as tolerated.  Good response to today's treatment.    Plan:  OP PT Plan  Certification Period Start Date: 09/10/24  Certification Period End Date: 10/08/23  Number of Treatments Authorized: 2/8    Goals:       Katelin Pitts, PTA

## 2024-09-27 ENCOUNTER — TREATMENT (OUTPATIENT)
Dept: PHYSICAL THERAPY | Facility: CLINIC | Age: 79
End: 2024-09-27
Payer: MEDICARE

## 2024-09-27 DIAGNOSIS — R29.898 WEAKNESS OF BOTH LOWER EXTREMITIES: ICD-10-CM

## 2024-09-27 DIAGNOSIS — M54.50 LOW BACK PAIN: Primary | ICD-10-CM

## 2024-09-27 DIAGNOSIS — R29.898 LEG WEAKNESS: ICD-10-CM

## 2024-09-27 DIAGNOSIS — M48.061 SPINAL STENOSIS OF LUMBAR REGION, UNSPECIFIED WHETHER NEUROGENIC CLAUDICATION PRESENT: ICD-10-CM

## 2024-09-27 PROCEDURE — 97110 THERAPEUTIC EXERCISES: CPT | Mod: GP,CQ

## 2024-09-27 ASSESSMENT — PAIN SCALES - GENERAL: PAINLEVEL_OUTOF10: 0 - NO PAIN

## 2024-09-27 ASSESSMENT — PAIN - FUNCTIONAL ASSESSMENT: PAIN_FUNCTIONAL_ASSESSMENT: 0-10

## 2024-09-27 NOTE — PROGRESS NOTES
"  Physical Therapy Treatment    Patient Name: Qamar Lowe Jr.  MRN: [id number]  Today's Date: 9/27/2024  Time Calculation  Start Time: 1051  Stop Time: 1136  Time Calculation (min): 45 min  PT Therapeutic Procedures Time Entry  Therapeutic Exercise Time Entry: 45,      Current Problem  Problem List Items Addressed This Visit    None  Visit Diagnoses         Codes    Low back pain    -  Primary M54.50    Spinal stenosis of lumbar region, unspecified whether neurogenic claudication present     M48.061    Weakness of both lower extremities     R29.898    Leg weakness     R29.898            Insurance:  Number of Treatments Authorized: 3/8  Certification Period Start Date: 09/10/24  Certification Period End Date: 10/08/23      Subjective   General  General Comment: Patient states he was good on Wednesday and feels good today.  He experienced leg cramping yesterday, in various mm groups, while performing HEP.  Night time cramping is the worst. HAs to get out of bed to \"walk it off.\"  Tried to use pillow between his knees in sidelying.  Experienced cramping during supine to SL position change and pillow did not help reduce sx.  He has some gym equipment at home and would like to know if he can use it.    Performing HEP?: Yes    Precautions  Precautions  Precautions Comment: Mod fall risk (Steadi score = 8)  Pain  Pain Assessment: 0-10  0-10 (Numeric) Pain Score: 0 - No pain  Pain Location: Leg    Objective       Treatments:    Therapeutic Exercise  Therapeutic Exercise Activity 1: SciFit Man L1 x 5 min  Therapeutic Exercise Activity 2: incine g/s stretch x 1 min  Therapeutic Exercise Activity 3: B leg press 60# x 12 - started to feel heaviness in calves  Therapeutic Exercise Activity 4: ANJANA over HS curl bar x 10 - reduce LS tension  Therapeutic Exercise Activity 5: Standing R/L hip ABD 2 x 10 ea  Therapeutic Exercise Activity 6: Standing R/L hip EXT x 10 ea - mild R calf tightness  Therapeutic Exercise Activity 7: ANJANA " "over HS curl bar x 10 - eliminated LE rad sx  Therapeutic Exercise Activity 8: seated ADD ball squeeze 5\" x 15  Therapeutic Exercise Activity 9: seated BTB hip ABD 5\" x 15  Therapeutic Exercise Activity 10: seated alt LAQ 5\" pause x 2 min  Therapeutic Exercise Activity 11: seated R/L HS stretch with foot on stool x 1 min ea  Therapeutic Exercise Activity 12: Matrix 15# rows with TVA 2 x 10  Therapeutic Exercise Activity 13: Matrix 15# iso row with retro wo x 2 min  Therapeutic Exercise Activity 14: sitting with lumbar roll                               OP EDUCATION:  Outpatient Education  Education Comment: Access Code: [email or web address]  URL: [email or web address]  Date: 09/27/2024  Prepared by: Viji Pitts    Exercises  - Standing Hip Abduction  - 1 x daily - 7 x weekly - 3 sets - 10 reps  - Standing Hip Extension  - 1 x daily - 7 x weekly - 3 sets - 10 reps  - Seated Long Arc Quad  - 1 x daily - 7 x weekly - 3 sets - 10 reps  - Seated Hamstring Stretch  - 1 x daily - 7 x weekly - 3 sets - 10 reps  - Standing Lumbar Extension with Counter  - 3-5 x daily - 7 x weekly - 10 reps    Assessment:  PT Assessment  Assessment Comment: Patient c/o various area of LE mm cramping R>L LE yesterday.  Patient responded well in the clinic to REIStanding with a reduction in his calf tightness that was produced with exercise.  Patient performed some weighted exercises to reproduce exercises he can do with his weight machines at home, as long as his sx don't increase.  Felt great after session.    Plan:  OP PT Plan  Certification Period Start Date: 09/10/24  Certification Period End Date: 10/08/23  Number of Treatments Authorized: 3/8    Goals:       Katelin Pitts, PTA  "

## 2024-09-30 ENCOUNTER — TREATMENT (OUTPATIENT)
Dept: PHYSICAL THERAPY | Facility: CLINIC | Age: 79
End: 2024-09-30
Payer: MEDICARE

## 2024-09-30 DIAGNOSIS — M48.061 SPINAL STENOSIS OF LUMBAR REGION, UNSPECIFIED WHETHER NEUROGENIC CLAUDICATION PRESENT: ICD-10-CM

## 2024-09-30 DIAGNOSIS — R29.898 LEG WEAKNESS: ICD-10-CM

## 2024-09-30 DIAGNOSIS — M54.50 LOW BACK PAIN: ICD-10-CM

## 2024-09-30 PROCEDURE — 97112 NEUROMUSCULAR REEDUCATION: CPT | Mod: GP

## 2024-09-30 PROCEDURE — 97110 THERAPEUTIC EXERCISES: CPT | Mod: GP

## 2024-09-30 NOTE — PROGRESS NOTES
Physical Therapy Treatment    Patient Name: Qamar Lowe Jr.  MRN: [id number]  Today's Date: 9/30/2024  Time Calculation  Start Time: 1607  Stop Time: 1645  Time Calculation (min): 38 min  PT Therapeutic Procedures Time Entry  Manual Therapy Time Entry: 3  Neuromuscular Re-Education Time Entry: 8  Therapeutic Exercise Time Entry: 27,      Current Problem  Problem List Items Addressed This Visit    None  Visit Diagnoses         Codes    Spinal stenosis of lumbar region, unspecified whether neurogenic claudication present     M48.061    Low back pain     M54.50    Leg weakness     R29.898            Insurance:  Number of Treatments Authorized: 4/8  Certification Period Start Date: 09/10/24  Certification Period End Date: 10/08/23      Subjective   General   The pain is improving.  The right side/leg is still painful and it will sometimes come on when he does the HEP. Working today, feeling pretty good.  Leg pain usually comes on after work on the ride home. Strength does not seem to be improving. Is most concerned with stability of legs.  Overall sx are approx 25% improved since eval.     Performing HEP?: Yes Using the lumbar roll.     Precautions  Precautions  Precautions Comment: Mod fall risk (Steadi score = 8)  Pain   Minimal low back pain - did not rate    Objective   Movement Loss - Nil/Min/Mod/Max limit or degrees  L/S Flexion: min - ERP  L/S Extension: min -  ERP  L/S R SGIS: min - ERP R sided   L/S L SGIS: min - ERP R sided    Treatments:     Therapeutic Exercise  Sitting with roll x 2 min   ANJANA over plinth 10 x 2 - increase, NW  REIL 10 x 2 - Increase, NW   Manual:  L/S ext/rot mobs B technique   Therex:  R SGIS 10 x 2 - P R sided LBP, NW  Seated L rot with Pt OP x 15 -P R sided LBP, Decreasing, NB  NM re-ed:  R, L Tandem stance x 1 min each    A/P weight shift  x 1 min   Romberg stance with eyes closed x 1 min   R, L hip hinge with toe tap x 10 each LE   R, L Mini march x 10 each LE    OP EDUCATION:    Access Code: [email or web address]  URL: [email or web address]  Date: 09/30/2024  Prepared by: Frandy Lopez    Exercises  - Left Standing Lateral Shift Correction at Wall - Hold  - 5 x daily - 7 x weekly - 1 sets - 10-20 reps  - Standing Tandem Balance with Counter Support  - 1 x daily - 7 x weekly - 2-3 sets - 10-20 reps  - Romberg Stance  - 1 x daily - 7 x weekly - 2-3 sets - 10-20 reps  - Standing March with Counter Support  - 1 x daily - 7 x weekly - 2-3 sets - 10-20 reps  - Standing Anterior Posterior Weight Shift  - 1 x daily - 7 x weekly - 2-3 sets - 10-20 reps  - Forward T with Counter Support  - 1 x daily - 7 x weekly - 2-3 sets - 10-20 reps    Assessment:   Pt has had miimal sx reduction since initial eval.  Ext procedures increased R sided LBP with no observable changes to mechanics. R SGIS did not improve baselines but nil/min improvement in sx indicate a new procedure should be incorporated into his management strategy.  He tolerated balance exercise well but stability was challenged.    Plan:  OP PT Plan  Certification Period Start Date: 09/10/24  Certification Period End Date: 10/08/23  Number of Treatments Authorized: 4/8F/U with R SGIS and ANJANA and additions to HEP   Progress balance/strength/stability as tolerated          Frandy Lopez, PT

## 2024-10-02 ENCOUNTER — TREATMENT (OUTPATIENT)
Dept: PHYSICAL THERAPY | Facility: CLINIC | Age: 79
End: 2024-10-02
Payer: MEDICARE

## 2024-10-02 DIAGNOSIS — M54.50 LOW BACK PAIN: Primary | ICD-10-CM

## 2024-10-02 DIAGNOSIS — M48.061 SPINAL STENOSIS OF LUMBAR REGION, UNSPECIFIED WHETHER NEUROGENIC CLAUDICATION PRESENT: ICD-10-CM

## 2024-10-02 DIAGNOSIS — R29.898 LEG WEAKNESS: ICD-10-CM

## 2024-10-02 PROCEDURE — 97112 NEUROMUSCULAR REEDUCATION: CPT | Mod: GP,CQ

## 2024-10-02 PROCEDURE — 97110 THERAPEUTIC EXERCISES: CPT | Mod: GP,CQ

## 2024-10-02 ASSESSMENT — PAIN SCALES - GENERAL: PAINLEVEL_OUTOF10: 5 - MODERATE PAIN

## 2024-10-02 ASSESSMENT — PAIN - FUNCTIONAL ASSESSMENT: PAIN_FUNCTIONAL_ASSESSMENT: 0-10

## 2024-10-02 NOTE — PROGRESS NOTES
Physical Therapy Treatment    Patient Name: Qamar Lowe Jr.  MRN: [id number]  Today's Date: 10/2/2024  Time Calculation  Start Time: 1410  Stop Time: 1500  Time Calculation (min): 50 min  PT Therapeutic Procedures Time Entry  Manual Therapy Time Entry: 6  Neuromuscular Re-Education Time Entry: 18  Therapeutic Exercise Time Entry: 26,      Current Problem  Problem List Items Addressed This Visit    None  Visit Diagnoses         Codes    Low back pain    -  Primary M54.50    Spinal stenosis of lumbar region, unspecified whether neurogenic claudication present     M48.061    Leg weakness     R29.898            Insurance:  Number of Treatments Authorized: 5/8  Certification Period Start Date: 09/10/24  Certification Period End Date: 10/08/23      Subjective   General  General Comment: Terrible today.  Just came from working a 7+ hour shift.  Felt good this morning and sx increased after 2 hours into shift.  C/O LPB, R LE sx and L knee pain. Has been performing SGIS into both directions    Performing HEP?: Yes    Precautions  Precautions  Precautions Comment: Mod fall risk  Pain  Pain Assessment: 0-10  0-10 (Numeric) Pain Score: 5 - Moderate pain  Pain Location: Leg  Pain Orientation: Right    Objective       Treatments:  Therapeutic Exercise  Sitting with roll x 2 min   ANJANA over plinth 10 x 2 - increase, NW  REIL 10 x 2 - Increase, NW   Manual:  L/S ext/rot mobs B technique   Therex:  R SGIS 10 x 2 - P R sided LBP, NW  Seated L rot with Pt OP x 15 -P R sided LBP, Decreasing, NB  NM re-ed:  R, L Tandem stance x 1 min each    A/P weight shift  x 1 min   Romberg stance with eyes closed x 1 min   R, L hip hinge with toe tap x 10 each LE   R, L Mini march x 10 each LE     OP EDUCATION:   Access Code: [email or web address]  URL: [email or web address]  Date: 09/30/2024  Prepared by: Frandy Lopez     Exercises  - Left Standing Lateral Shift Correction at Wall - Hold  - 5 x daily - 7 x weekly - 1 sets - 10-20  "reps  - Standing Tandem Balance with Counter Support  - 1 x daily - 7 x weekly - 2-3 sets - 10-20 reps  - Romberg Stance  - 1 x daily - 7 x weekly - 2-3 sets - 10-20 reps  - Standing March with Counter Support  - 1 x daily - 7 x weekly - 2-3 sets - 10-20 reps  - Standing Anterior Posterior Weight Shift  - 1 x daily - 7 x weekly - 2-3 sets - 10-20 reps  - Forward T with Counter Support  - 1 x daily - 7 x weekly - 2-3 sets - 10-20 reps      Therapeutic Exercise  Therapeutic Exercise Activity 1: ANJANA over plinth x 10 - decrease LS sx - increase pain urther up the back  Therapeutic Exercise Activity 2: left standing lateral shift correction x 10 - decreased sx felt better  Therapeutic Exercise Activity 3: sit to stand from elevated plinth x 10 - eliminated L knee pain  Therapeutic Exercise Activity 4: seated R/L QS with heel prop 5\" x 10  Therapeutic Exercise Activity 5: seated ADD ball squeeze 5\" x 15  Therapeutic Exercise Activity 6: seated BTB hip ABD 5\" x 15  Therapeutic Exercise Activity 7: discuss performing correction exercise more frequently during the work day - \"sneak it in\"    Balance/Neuromuscular Re-Education  Balance/Neuromuscular Re-Education Activity 1: NBOS on airex pad x 1 min  Balance/Neuromuscular Re-Education Activity 2: NBOS on airex with EC - CGA 3 x 20\"  Balance/Neuromuscular Re-Education Activity 3: tilt board #2 controlled taps and balance x 1 min  Balance/Neuromuscular Re-Education Activity 4: tilt board #1 controlled taps and balance x 1 min ea  Balance/Neuromuscular Re-Education Activity 5: lateral walk along airex beam x 1 min                          OP EDUCATION:       Assessment:  PT Assessment  Assessment Comment: Patient arrived with increased pain sx today after being at work.  Time spent discussing work activities and repetitive movement.  Advised patient in increase frequency of spine correction exercises during the work day and to only sideglide in the one direction.  As patient " performed ther ex today, his sx receeded until he was pain free at the conclusion.  Challenged with balance and proprioceptive exercises.    Plan:  OP PT Plan  Certification Period Start Date: 09/10/24  Certification Period End Date: 10/08/23  Number of Treatments Authorized: 5/8    Goals:       Katelin Pitts, PTA

## 2024-10-07 ENCOUNTER — TREATMENT (OUTPATIENT)
Dept: PHYSICAL THERAPY | Facility: CLINIC | Age: 79
End: 2024-10-07
Payer: MEDICARE

## 2024-10-07 DIAGNOSIS — R29.898 LEG WEAKNESS: ICD-10-CM

## 2024-10-07 DIAGNOSIS — M48.061 SPINAL STENOSIS OF LUMBAR REGION, UNSPECIFIED WHETHER NEUROGENIC CLAUDICATION PRESENT: ICD-10-CM

## 2024-10-07 DIAGNOSIS — M54.50 LOW BACK PAIN: ICD-10-CM

## 2024-10-07 PROCEDURE — 97110 THERAPEUTIC EXERCISES: CPT | Mod: GP

## 2024-10-07 NOTE — PROGRESS NOTES
Physical Therapy RECHECK/Treatment    Patient Name: Qamar Lowe Jr.  MRN: [id number]  Today's Date: 10/7/2024  Time Calculation  Start Time: 1303  Stop Time: 1340  Time Calculation (min): 37 min  PT Therapeutic Procedures Time Entry  Manual Therapy Time Entry: 7  Therapeutic Exercise Time Entry: 24,      Current Problem  Problem List Items Addressed This Visit    None  Visit Diagnoses         Codes    Spinal stenosis of lumbar region, unspecified whether neurogenic claudication present     M48.061    Low back pain     M54.50    Leg weakness     R29.898            Insurance:  Number of Treatments Authorized: 6/8  Certification Period Start Date: 09/10/24  Certification Period End Date: 10/08/23      Subjective   General   Legs are weak today, sx are improving.  Best day he has had at work in a while. Strength is not where it needs to be.  Has to hold on and use his arms when climbing stairs.  Balance is still not good either.  Walking on soft surfaces and on wet/slippery surfaces is hard/feels unsafe. The pain is approx 50% reduced, because the pain is less intensy.  It is about as frequent.      Performing HEP?: Yes    Precautions  Precautions  Precautions Comment: Mod fall risk  Pain   0/10 LBP     Objective   SAMUEL:19/50 = 38%  Lumbar Spine Movement Loss - Nil/Min/Mod/Max limit or degrees  Flexion: min - ERP  Extension: min   R SGIS: nil -   L SGIS: nil    Treatments:  Therapeutic Exercise  R SGIS  shift correction x 10   ANJANA x 10   Balance/Neuromuscular Re-Education  tilt board #2 controlled taps and balance x 1 min  tilt board #1 controlled taps and balance x 1 min ea - produced LBP   Therapeutic Exercise  R SGIS  shift correction x 10   ANJANA x 10 - decrease, better   HL bridge 1 min x 2   HL B ER with black band 1 min x 2   HL R, L SLR 1 min x 2 each LE   sit to stand from elevated plinth x 10 - eliminated L knee pain  R SGIS  shift correction x 10   ANJANA x 10       OP EDUCATION:   Access Code:  [email or web address]  URL: [email or web address]  Date: 09/30/2024  Prepared by: Frandy Lopez     Exercises  - Left Standing Lateral Shift Correction at Wall - Hold  - 5 x daily - 7 x weekly - 1 sets - 10-20 reps  - Standing Tandem Balance with Counter Support  - 1 x daily - 7 x weekly - 2-3 sets - 10-20 reps  - Romberg Stance  - 1 x daily - 7 x weekly - 2-3 sets - 10-20 reps  - Standing March with Counter Support  - 1 x daily - 7 x weekly - 2-3 sets - 10-20 reps  - Standing Anterior Posterior Weight Shift  - 1 x daily - 7 x weekly - 2-3 sets - 10-20 reps  - Forward T with Counter Support  - 1 x daily - 7 x weekly - 2-3 sets - 10-20 reps      Assessment:   Pt has progressed well towards pain goals since eval as shown by subjective report of improvement in sx since starting PT with good HEP compliance. He has made minimal progress with functional baselines, like stability of non-compliant/slippery surfaces and strength/climbing stairs.  He tolerated the session well, felt good leaving PT.  He will continue to benefit from Pt to fully achieve goals set at eval.     Plan:  OP PT Plan  Certification Period Start Date: 09/10/24  Certification Period End Date: 10/08/23  Number of Treatments Authorized: 6/8        Frandy Lopez, PT

## 2024-10-09 ENCOUNTER — APPOINTMENT (OUTPATIENT)
Dept: PHYSICAL THERAPY | Facility: CLINIC | Age: 79
End: 2024-10-09
Payer: MEDICARE

## 2024-10-16 ENCOUNTER — APPOINTMENT (OUTPATIENT)
Dept: PHYSICAL THERAPY | Facility: CLINIC | Age: 79
End: 2024-10-16
Payer: MEDICARE

## 2024-11-12 ENCOUNTER — LAB (OUTPATIENT)
Dept: LAB | Facility: LAB | Age: 79
End: 2024-11-12
Payer: MEDICARE

## 2024-11-12 DIAGNOSIS — E78.2 MIXED HYPERLIPIDEMIA: ICD-10-CM

## 2024-11-12 DIAGNOSIS — E11.9 CONTROLLED TYPE 2 DIABETES MELLITUS WITHOUT COMPLICATION, WITHOUT LONG-TERM CURRENT USE OF INSULIN (MULTI): ICD-10-CM

## 2024-11-12 LAB
ALBUMIN SERPL BCP-MCNC: 3.6 G/DL (ref 3.4–5)
ALP SERPL-CCNC: 45 U/L (ref 33–136)
ALT SERPL W P-5'-P-CCNC: 12 U/L (ref 10–52)
ANION GAP SERPL CALC-SCNC: 11 MMOL/L (ref 10–20)
AST SERPL W P-5'-P-CCNC: 19 U/L (ref 9–39)
BASOPHILS # BLD AUTO: 0.02 X10*3/UL (ref 0–0.1)
BASOPHILS NFR BLD AUTO: 0.5 %
BILIRUB SERPL-MCNC: 0.6 MG/DL (ref 0–1.2)
BUN SERPL-MCNC: 25 MG/DL (ref 6–23)
CALCIUM SERPL-MCNC: 8.7 MG/DL (ref 8.6–10.6)
CHLORIDE SERPL-SCNC: 109 MMOL/L (ref 98–107)
CHOLEST SERPL-MCNC: 136 MG/DL (ref 0–199)
CHOLESTEROL/HDL RATIO: 2.9
CO2 SERPL-SCNC: 28 MMOL/L (ref 21–32)
CREAT SERPL-MCNC: 0.83 MG/DL (ref 0.5–1.3)
CREAT UR-MCNC: 69 MG/DL (ref 20–370)
EGFRCR SERPLBLD CKD-EPI 2021: 88 ML/MIN/1.73M*2
EOSINOPHIL # BLD AUTO: 0.28 X10*3/UL (ref 0–0.4)
EOSINOPHIL NFR BLD AUTO: 7.1 %
ERYTHROCYTE [DISTWIDTH] IN BLOOD BY AUTOMATED COUNT: 14.6 % (ref 11.5–14.5)
GLUCOSE SERPL-MCNC: 92 MG/DL (ref 74–99)
HCT VFR BLD AUTO: 44.4 % (ref 41–52)
HDLC SERPL-MCNC: 47.1 MG/DL
HGB BLD-MCNC: 14.1 G/DL (ref 13.5–17.5)
IMM GRANULOCYTES # BLD AUTO: 0.01 X10*3/UL (ref 0–0.5)
IMM GRANULOCYTES NFR BLD AUTO: 0.3 % (ref 0–0.9)
LDLC SERPL CALC-MCNC: 74 MG/DL
LYMPHOCYTES # BLD AUTO: 1.09 X10*3/UL (ref 0.8–3)
LYMPHOCYTES NFR BLD AUTO: 27.6 %
MCH RBC QN AUTO: 29.7 PG (ref 26–34)
MCHC RBC AUTO-ENTMCNC: 31.8 G/DL (ref 32–36)
MCV RBC AUTO: 94 FL (ref 80–100)
MICROALBUMIN UR-MCNC: <7 MG/L
MICROALBUMIN/CREAT UR: NORMAL MG/G{CREAT}
MONOCYTES # BLD AUTO: 0.45 X10*3/UL (ref 0.05–0.8)
MONOCYTES NFR BLD AUTO: 11.4 %
NEUTROPHILS # BLD AUTO: 2.1 X10*3/UL (ref 1.6–5.5)
NEUTROPHILS NFR BLD AUTO: 53.1 %
NON HDL CHOLESTEROL: 89 MG/DL (ref 0–149)
NRBC BLD-RTO: 0 /100 WBCS (ref 0–0)
PLATELET # BLD AUTO: 206 X10*3/UL (ref 150–450)
POTASSIUM SERPL-SCNC: 4.6 MMOL/L (ref 3.5–5.3)
PROT SERPL-MCNC: 6.2 G/DL (ref 6.4–8.2)
RBC # BLD AUTO: 4.75 X10*6/UL (ref 4.5–5.9)
SODIUM SERPL-SCNC: 143 MMOL/L (ref 136–145)
TRIGL SERPL-MCNC: 77 MG/DL (ref 0–149)
VLDL: 15 MG/DL (ref 0–40)
WBC # BLD AUTO: 4 X10*3/UL (ref 4.4–11.3)

## 2024-11-12 PROCEDURE — 80053 COMPREHEN METABOLIC PANEL: CPT

## 2024-11-12 PROCEDURE — 82570 ASSAY OF URINE CREATININE: CPT

## 2024-11-12 PROCEDURE — 85025 COMPLETE CBC W/AUTO DIFF WBC: CPT

## 2024-11-12 PROCEDURE — 80061 LIPID PANEL: CPT

## 2024-11-12 PROCEDURE — 36415 COLL VENOUS BLD VENIPUNCTURE: CPT

## 2024-11-12 PROCEDURE — 82043 UR ALBUMIN QUANTITATIVE: CPT

## 2024-11-26 ENCOUNTER — APPOINTMENT (OUTPATIENT)
Dept: PRIMARY CARE | Facility: CLINIC | Age: 79
End: 2024-11-26
Payer: MEDICARE

## 2024-11-26 ENCOUNTER — HOSPITAL ENCOUNTER (OUTPATIENT)
Dept: RADIOLOGY | Facility: CLINIC | Age: 79
Discharge: HOME | End: 2024-11-26
Payer: MEDICARE

## 2024-11-26 VITALS
HEIGHT: 71 IN | SYSTOLIC BLOOD PRESSURE: 134 MMHG | HEART RATE: 53 BPM | WEIGHT: 281.6 LBS | DIASTOLIC BLOOD PRESSURE: 56 MMHG | BODY MASS INDEX: 39.42 KG/M2 | OXYGEN SATURATION: 97 %

## 2024-11-26 DIAGNOSIS — E11.9 CONTROLLED TYPE 2 DIABETES MELLITUS WITHOUT COMPLICATION, WITHOUT LONG-TERM CURRENT USE OF INSULIN (MULTI): Primary | ICD-10-CM

## 2024-11-26 DIAGNOSIS — E11.8 CONTROLLED TYPE 2 DIABETES MELLITUS WITH COMPLICATION, WITHOUT LONG-TERM CURRENT USE OF INSULIN (MULTI): ICD-10-CM
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
## 2024-11-26 DIAGNOSIS — R29.818 NEUROGENIC CLAUDICATION: ICD-10-CM

## 2024-11-26 DIAGNOSIS — H91.93 BILATERAL HEARING LOSS, UNSPECIFIED HEARING LOSS TYPE: ICD-10-CM

## 2024-11-26 DIAGNOSIS — R07.81 RIB PAIN: ICD-10-CM

## 2024-11-26 DIAGNOSIS — M48.062 SPINAL STENOSIS OF LUMBAR REGION WITH NEUROGENIC CLAUDICATION: ICD-10-CM

## 2024-11-26 DIAGNOSIS — E53.8 B12 DEFICIENCY: ICD-10-CM

## 2024-11-26 DIAGNOSIS — Z12.5 PROSTATE CANCER SCREENING: ICD-10-CM

## 2024-11-26 DIAGNOSIS — E78.2 MIXED HYPERLIPIDEMIA: ICD-10-CM

## 2024-11-26 DIAGNOSIS — Z00.00 ROUTINE GENERAL MEDICAL EXAMINATION AT HEALTH CARE FACILITY: ICD-10-CM

## 2024-11-26 DIAGNOSIS — F41.8 SITUATIONAL ANXIETY: ICD-10-CM

## 2024-11-26 DIAGNOSIS — R29.6 FREQUENT FALLS: ICD-10-CM

## 2024-11-26 LAB — POC HEMOGLOBIN A1C: 5.5 % (ref 4.2–6.5)

## 2024-11-26 PROCEDURE — 99214 OFFICE O/P EST MOD 30 MIN: CPT | Performed by: STUDENT IN AN ORGANIZED HEALTH CARE EDUCATION/TRAINING PROGRAM

## 2024-11-26 PROCEDURE — 1170F FXNL STATUS ASSESSED: CPT | Performed by: STUDENT IN AN ORGANIZED HEALTH CARE EDUCATION/TRAINING PROGRAM

## 2024-11-26 PROCEDURE — 1123F ACP DISCUSS/DSCN MKR DOCD: CPT | Performed by: STUDENT IN AN ORGANIZED HEALTH CARE EDUCATION/TRAINING PROGRAM

## 2024-11-26 PROCEDURE — 1036F TOBACCO NON-USER: CPT | Performed by: STUDENT IN AN ORGANIZED HEALTH CARE EDUCATION/TRAINING PROGRAM

## 2024-11-26 PROCEDURE — 1159F MED LIST DOCD IN RCRD: CPT | Performed by: STUDENT IN AN ORGANIZED HEALTH CARE EDUCATION/TRAINING PROGRAM

## 2024-11-26 PROCEDURE — 1157F ADVNC CARE PLAN IN RCRD: CPT | Performed by: STUDENT IN AN ORGANIZED HEALTH CARE EDUCATION/TRAINING PROGRAM

## 2024-11-26 PROCEDURE — 71100 X-RAY EXAM RIBS UNI 2 VIEWS: CPT | Mod: LT

## 2024-11-26 PROCEDURE — 83036 HEMOGLOBIN GLYCOSYLATED A1C: CPT | Performed by: STUDENT IN AN ORGANIZED HEALTH CARE EDUCATION/TRAINING PROGRAM

## 2024-11-26 PROCEDURE — G0439 PPPS, SUBSEQ VISIT: HCPCS | Performed by: STUDENT IN AN ORGANIZED HEALTH CARE EDUCATION/TRAINING PROGRAM

## 2024-11-26 PROCEDURE — 99397 PER PM REEVAL EST PAT 65+ YR: CPT | Performed by: STUDENT IN AN ORGANIZED HEALTH CARE EDUCATION/TRAINING PROGRAM

## 2024-11-26 PROCEDURE — 1160F RVW MEDS BY RX/DR IN RCRD: CPT | Performed by: STUDENT IN AN ORGANIZED HEALTH CARE EDUCATION/TRAINING PROGRAM

## 2024-11-26 RX ORDER — ALPRAZOLAM 0.5 MG/1
0.5 TABLET ORAL DAILY PRN
Qty: 3 TABLET | Refills: 0 | Status: SHIPPED | OUTPATIENT
Start: 2024-11-26 | End: 2024-11-29

## 2024-11-26 RX ORDER — CYCLOBENZAPRINE HCL 10 MG
10 TABLET ORAL NIGHTLY PRN
Qty: 30 TABLET | Refills: 0 | Status: SHIPPED | OUTPATIENT
Start: 2024-11-26 | End: 2024-12-26

## 2024-11-26 ASSESSMENT — ACTIVITIES OF DAILY LIVING (ADL)
DOING_HOUSEWORK: INDEPENDENT
DRESSING: INDEPENDENT
MANAGING_FINANCES: INDEPENDENT
GROCERY_SHOPPING: INDEPENDENT
BATHING: INDEPENDENT
TAKING_MEDICATION: INDEPENDENT

## 2024-11-26 ASSESSMENT — ENCOUNTER SYMPTOMS
LOSS OF SENSATION IN FEET: 1
OCCASIONAL FEELINGS OF UNSTEADINESS: 1
DEPRESSION: 0

## 2024-11-26 NOTE — LETTER
November 26, 2024     Patient: Qamar Lowe Jr.   YOB: 1945   Date of Visit: 11/26/2024       To Whom It May Concern:    It is my medical opinion that Qamar Lowe may return to work on 12/09/2024 with a 20 lb lifting restriction .    If you have any questions or concerns, please don't hesitate to call.         Sincerely,        María Elena Scott DO    CC: No Recipients

## 2024-11-26 NOTE — PROGRESS NOTES
"Subjective   Reason for Visit: Qamar Lowe Jr. is an 79 y.o. male here for a Medicare Wellness visit.     Past Medical, Surgical, and Family History reviewed and updated in chart.    Reviewed all medications by prescribing practitioner or clinical pharmacist (such as prescriptions, OTCs, herbal therapies and supplements) and documented in the medical record.    HPI     Falls   They just went out from out under him.  This is the second time this has happened   Orthopedic evaluation: neurogenic claudication and lumbar stenosis  Neurology evaluation pt unable to schedule due to next appointment being 5 months away  Injury: nasal  bridge, left ribs and right arm   No numbness, no pain in the legs   Xray: mild to moderate facet disease and spondylosis   PVR was negative   Has been taking ibuprofen for his pain         Patient Care Team:  María Elena Scott DO as PCP - General (Family Medicine)  María Elena Scott DO as PCP - [employer] PCP       Objective   Vitals:  /56   Pulse 53   Ht 1.803 m (5' 11\")   Wt 128 kg (281 lb 9.6 oz)   SpO2 97%   BMI 39.28 kg/m²       Physical Exam  Vitals reviewed.         Assessment & Plan  Controlled type 2 diabetes mellitus without complication, without long-term current use of insulin (Multi)    Orders:    POCT glycosylated hemoglobin (Hb A1C) manually resulted    CBC and Auto Differential; Future    Comprehensive metabolic panel; Future    Iron and TIBC; Future    Ferritin; Future    Neurogenic claudication    Orders:    MR lumbar spine wo IV contrast; Future    MR cervical spine wo IV contrast; Future    Spinal stenosis of lumbar region with neurogenic claudication    Orders:    MR lumbar spine wo IV contrast; Future    Frequent falls    Orders:    MR lumbar spine wo IV contrast; Future    cyclobenzaprine (Flexeril) 10 mg tablet; Take 1 tablet (10 mg) by mouth as needed at bedtime for muscle spasms.    Fall, subsequent encounter    Orders:    XR ribs left 2 " views; Future    Iron and TIBC; Future    Ferritin; Future    Mixed hyperlipidemia         Prostate cancer screening    Orders:    Prostate Spec.Ag,Screen; Future    Bilateral hearing loss, unspecified hearing loss type    Orders:    Referral to Audiology; Future    B12 deficiency    Orders:    Vitamin B12; Future    Situational anxiety    Orders:    ALPRAZolam (Xanax) 0.5 mg tablet; Take 1 tablet (0.5 mg) by mouth once daily as needed for anxiety for up to 3 days.    Controlled type 2 diabetes mellitus with complication, without long-term current use of insulin (Multi)         Rib pain    Orders:    cyclobenzaprine (Flexeril) 10 mg tablet; Take 1 tablet (10 mg) by mouth as needed at bedtime for muscle spasms.    Routine general medical examination at health care facility         Qamar Lowe for 79 year old female who presents to the office for 6 month follow up and medication refills.      DM  IO A1C 5.5%  refilled Pioglitazone 30mg daily .   Legs continue to have moderate swelling, mildly reduced circulation  [employer]   reviewed his logs- to be scanned into the chart   eye exam is UTD   We reviewed healthy lifestyle choices and changes. Advised to avoid fatty foods such as processed food, sweets; avoid foods heavy in sugar and salt. Maintain regular exercise 20 min daily or a total of 120 min weekly of moderate exercise.     HTN  BP was good at 134/56 mmHg  Physical exam was stable.   refilled isosorbide 30mg daily   Discussed maintaining diets such as DASH to help maintain normal Blood pressure. Encouraged regular exercise for a total of 120min weekly.     Frequent Falls   Orthopedic evaluation: neurogenic claudication and lumbar stenosis  Neurology evaluation pt unable to schedule due to next appointment being 5 months away  Injury: nasal  bridge, left ribs and right arm   No numbness, no pain in the legs   Xray: mild to moderate facet disease and spondylosis   PVR was negative   Has been taking ibuprofen  for his pain   Rib pain along Rib 8 mid clavicular line to axilla   MRI lumbar and cervical ordered   Xanax given for pre procedure      Right knee pain  Left knee replaced   OA  CMC joint arthritis   following with ortho     DSL  continue fenofibrate 160mg daily   continue simvastatin 80mg      Leukopenia   persistent leukopenia   plan for heme Intervention if continues     Health Maintenance  follow up in 3 months for DM fu

## 2024-11-26 NOTE — LETTER
November 26, 2024     Patient: Qamar Lowe Jr.   YOB: 1945   Date of Visit: 11/26/2024       To Whom It May Concern:    Qamar Lowe was seen in my clinic on 11/26/2024 at 9:00 am. Please excuse Qamar for his absence from work on this day to make the appointment. Pt will be released back to work on 12/09/2024.    If you have any questions or concerns, please don't hesitate to call.         Sincerely,         María Elena Scott DO        CC: No Recipients

## 2024-11-26 NOTE — ASSESSMENT & PLAN NOTE
Orders:    POCT glycosylated hemoglobin (Hb A1C) manually resulted    CBC and Auto Differential; Future    Comprehensive metabolic panel; Future    Iron and TIBC; Future    Ferritin; Future

## 2025-01-03 DIAGNOSIS — E78.5 HYPERLIPIDEMIA, UNSPECIFIED HYPERLIPIDEMIA TYPE: ICD-10-CM

## 2025-01-06 RX ORDER — SIMVASTATIN 80 MG/1
TABLET, FILM COATED ORAL
Qty: 100 TABLET | Refills: 2 | Status: SHIPPED | OUTPATIENT
Start: 2025-01-06

## 2025-01-08 ENCOUNTER — APPOINTMENT (OUTPATIENT)
Dept: AUDIOLOGY | Facility: CLINIC | Age: 80
End: 2025-01-08
Payer: MEDICARE

## 2025-02-06 ENCOUNTER — DOCUMENTATION (OUTPATIENT)
Dept: PHYSICAL THERAPY | Facility: CLINIC | Age: 80
End: 2025-02-06
Payer: MEDICARE

## 2025-02-06 NOTE — PROGRESS NOTES
Discharge Summary    Name: Qamar Lowe Jr.  MRN: [id number]  : 1945  Date: 25    Discharge Summary: PT    Discharge Information: Date of discharge 25    Therapy Summary: Pt has progressed well towards pain goals since eval as shown by subjective report of improvement in sx since starting PT with good HEP compliance. He has made minimal progress with functional baselines, like stability of non-compliant/slippery surfaces and strength/climbing stairs. He tolerated the session well, felt good leaving PT. He will continue to benefit from Pt to fully achieve goals set at eval.     Discharge Status: Strength is not where it needs to be. Has to hold on and use his arms when climbing stairs. Balance is still not good either. Walking on soft surfaces and on wet/slippery surfaces is hard/feels unsafe. The pain is approx 50% reduced, because the pain is less intensy. It is about as frequent.      Rehab Discharge Reason: Progress plateaued; further improvement possible

## 2025-03-04 ENCOUNTER — TELEPHONE (OUTPATIENT)
Dept: PRIMARY CARE | Facility: CLINIC | Age: 80
End: 2025-03-04
Payer: MEDICARE

## 2025-05-07 DIAGNOSIS — E11.8 CONTROLLED TYPE 2 DIABETES MELLITUS WITH COMPLICATION, WITHOUT LONG-TERM CURRENT USE OF INSULIN (MULTI): ICD-10-CM

## 2025-05-08 RX ORDER — PIOGLITAZONE 30 MG/1
30 TABLET ORAL DAILY
Qty: 90 TABLET | Refills: 2 | Status: SHIPPED | OUTPATIENT
Start: 2025-05-08

## 2025-05-12 DIAGNOSIS — E11.9 CONTROLLED TYPE 2 DIABETES MELLITUS WITHOUT COMPLICATION, WITHOUT LONG-TERM CURRENT USE OF INSULIN: ICD-10-CM

## 2025-05-12 DIAGNOSIS — E78.5 HYPERLIPIDEMIA, UNSPECIFIED HYPERLIPIDEMIA TYPE: ICD-10-CM

## 2025-05-12 RX ORDER — BLOOD SUGAR DIAGNOSTIC
STRIP MISCELLANEOUS
Qty: 200 STRIP | Refills: 2 | Status: SHIPPED | OUTPATIENT
Start: 2025-05-12

## 2025-05-12 RX ORDER — FENOFIBRATE 160 MG/1
160 TABLET ORAL DAILY
Qty: 90 TABLET | Refills: 1 | Status: SHIPPED | OUTPATIENT
Start: 2025-05-12

## 2025-05-21 LAB
ALBUMIN SERPL-MCNC: 3.6 G/DL (ref 3.6–5.1)
ALP SERPL-CCNC: 42 U/L (ref 35–144)
ALT SERPL-CCNC: 12 U/L (ref 9–46)
ANION GAP SERPL CALCULATED.4IONS-SCNC: 8 MMOL/L (CALC) (ref 7–17)
AST SERPL-CCNC: 18 U/L (ref 10–35)
BASOPHILS # BLD AUTO: 22 CELLS/UL (ref 0–200)
BASOPHILS NFR BLD AUTO: 0.5 %
BILIRUB SERPL-MCNC: 0.7 MG/DL (ref 0.2–1.2)
BUN SERPL-MCNC: 17 MG/DL (ref 7–25)
CALCIUM SERPL-MCNC: 8.6 MG/DL (ref 8.6–10.3)
CHLORIDE SERPL-SCNC: 105 MMOL/L (ref 98–110)
CO2 SERPL-SCNC: 27 MMOL/L (ref 20–32)
CREAT SERPL-MCNC: 0.82 MG/DL (ref 0.7–1.22)
EGFRCR SERPLBLD CKD-EPI 2021: 88 ML/MIN/1.73M2
EOSINOPHIL # BLD AUTO: 159 CELLS/UL (ref 15–500)
EOSINOPHIL NFR BLD AUTO: 3.7 %
ERYTHROCYTE [DISTWIDTH] IN BLOOD BY AUTOMATED COUNT: 13.8 % (ref 11–15)
FERRITIN SERPL-MCNC: 146 NG/ML (ref 24–380)
GLUCOSE SERPL-MCNC: 93 MG/DL (ref 65–99)
HCT VFR BLD AUTO: 45.4 % (ref 38.5–50)
HGB BLD-MCNC: 14.3 G/DL (ref 13.2–17.1)
IRON SATN MFR SERPL: 27 % (CALC) (ref 20–48)
IRON SERPL-MCNC: 90 MCG/DL (ref 50–180)
LYMPHOCYTES # BLD AUTO: 894 CELLS/UL (ref 850–3900)
LYMPHOCYTES NFR BLD AUTO: 20.8 %
MCH RBC QN AUTO: 30 PG (ref 27–33)
MCHC RBC AUTO-ENTMCNC: 31.5 G/DL (ref 32–36)
MCV RBC AUTO: 95.4 FL (ref 80–100)
MONOCYTES # BLD AUTO: 383 CELLS/UL (ref 200–950)
MONOCYTES NFR BLD AUTO: 8.9 %
NEUTROPHILS # BLD AUTO: 2842 CELLS/UL (ref 1500–7800)
NEUTROPHILS NFR BLD AUTO: 66.1 %
PLATELET # BLD AUTO: 198 THOUSAND/UL (ref 140–400)
PMV BLD REES-ECKER: 10.7 FL (ref 7.5–12.5)
POTASSIUM SERPL-SCNC: 4.2 MMOL/L (ref 3.5–5.3)
PROT SERPL-MCNC: 6.1 G/DL (ref 6.1–8.1)
RBC # BLD AUTO: 4.76 MILLION/UL (ref 4.2–5.8)
SODIUM SERPL-SCNC: 140 MMOL/L (ref 135–146)
TIBC SERPL-MCNC: 337 MCG/DL (CALC) (ref 250–425)
VIT B12 SERPL-MCNC: 446 PG/ML (ref 200–1100)
WBC # BLD AUTO: 4.3 THOUSAND/UL (ref 3.8–10.8)

## 2025-05-22 LAB — PSA SERPL-MCNC: 1.93 NG/ML

## 2025-05-28 ENCOUNTER — APPOINTMENT (OUTPATIENT)
Dept: PRIMARY CARE | Facility: CLINIC | Age: 80
End: 2025-05-28
Payer: MEDICARE

## 2025-05-28 ENCOUNTER — HOSPITAL ENCOUNTER (OUTPATIENT)
Dept: RADIOLOGY | Facility: HOSPITAL | Age: 80
Discharge: HOME | End: 2025-05-28
Payer: MEDICARE

## 2025-05-28 VITALS
BODY MASS INDEX: 40.46 KG/M2 | WEIGHT: 289 LBS | SYSTOLIC BLOOD PRESSURE: 116 MMHG | HEART RATE: 58 BPM | OXYGEN SATURATION: 98 % | HEIGHT: 71 IN | DIASTOLIC BLOOD PRESSURE: 56 MMHG

## 2025-05-28 DIAGNOSIS — M79.89 LEG SWELLING: ICD-10-CM

## 2025-05-28 DIAGNOSIS — R06.2 WHEEZING: ICD-10-CM

## 2025-05-28 DIAGNOSIS — E66.01 SEVERE OBESITY (MULTI): ICD-10-CM

## 2025-05-28 DIAGNOSIS — R06.09 DYSPNEA ON EXERTION: ICD-10-CM

## 2025-05-28 DIAGNOSIS — E11.9 CONTROLLED TYPE 2 DIABETES MELLITUS WITHOUT COMPLICATION, WITHOUT LONG-TERM CURRENT USE OF INSULIN: Primary | ICD-10-CM

## 2025-05-28 DIAGNOSIS — J34.89 RHINORRHEA: ICD-10-CM

## 2025-05-28 LAB — POC HEMOGLOBIN A1C: 5.6 % (ref 4.2–6.5)

## 2025-05-28 PROCEDURE — 99214 OFFICE O/P EST MOD 30 MIN: CPT | Performed by: STUDENT IN AN ORGANIZED HEALTH CARE EDUCATION/TRAINING PROGRAM

## 2025-05-28 PROCEDURE — 1160F RVW MEDS BY RX/DR IN RCRD: CPT | Performed by: STUDENT IN AN ORGANIZED HEALTH CARE EDUCATION/TRAINING PROGRAM

## 2025-05-28 PROCEDURE — 1157F ADVNC CARE PLAN IN RCRD: CPT | Performed by: STUDENT IN AN ORGANIZED HEALTH CARE EDUCATION/TRAINING PROGRAM

## 2025-05-28 PROCEDURE — 71046 X-RAY EXAM CHEST 2 VIEWS: CPT

## 2025-05-28 PROCEDURE — 71046 X-RAY EXAM CHEST 2 VIEWS: CPT | Performed by: RADIOLOGY

## 2025-05-28 PROCEDURE — 83036 HEMOGLOBIN GLYCOSYLATED A1C: CPT | Performed by: STUDENT IN AN ORGANIZED HEALTH CARE EDUCATION/TRAINING PROGRAM

## 2025-05-28 PROCEDURE — G2211 COMPLEX E/M VISIT ADD ON: HCPCS | Performed by: STUDENT IN AN ORGANIZED HEALTH CARE EDUCATION/TRAINING PROGRAM

## 2025-05-28 PROCEDURE — 1159F MED LIST DOCD IN RCRD: CPT | Performed by: STUDENT IN AN ORGANIZED HEALTH CARE EDUCATION/TRAINING PROGRAM

## 2025-05-28 PROCEDURE — 1123F ACP DISCUSS/DSCN MKR DOCD: CPT | Performed by: STUDENT IN AN ORGANIZED HEALTH CARE EDUCATION/TRAINING PROGRAM

## 2025-05-28 RX ORDER — ATROPINE SULFATE 0.1 MG/ML
.25-5 INJECTION INTRAVENOUS ONCE AS NEEDED
OUTPATIENT
Start: 2025-05-28

## 2025-05-28 RX ORDER — TORSEMIDE 20 MG/1
20 TABLET ORAL DAILY
Qty: 90 TABLET | Refills: 1 | Status: SHIPPED | OUTPATIENT
Start: 2025-05-28 | End: 2026-05-28

## 2025-05-28 RX ORDER — DOBUTAMINE HYDROCHLORIDE 100 MG/100ML
5-40 INJECTION INTRAVENOUS CONTINUOUS
OUTPATIENT
Start: 2025-05-28

## 2025-05-28 ASSESSMENT — ENCOUNTER SYMPTOMS
DEPRESSION: 0
OCCASIONAL FEELINGS OF UNSTEADINESS: 1
LOSS OF SENSATION IN FEET: 0

## 2025-05-28 NOTE — PROGRESS NOTES
"  Subjective   Patient ID:   Qamar Lowe Jr. is a  80 y.o. male who presents for Follow-up (Pt is here for a 6 mo follow up.).     HPI   Every meal his nose is running without having severe rhinorrhea  Congestion in the throat all day     Leg swelling   Currently compliant with his diuretic   Feels that his legs are not going down   Feels that he is getting more dyspneic on exertion  Had some increased   PVR was negative 5/24  Hx of HERMINIO  Cardiac silhouetter was wnl on rib xray on   No orthopnea               Current Medications[1]    Visit Vitals  /56   Pulse 58   Ht 1.803 m (5' 11\")   Wt 131 kg (289 lb)   SpO2 98%   BMI 40.31 kg/m²   Smoking Status Never   BSA 2.56 m²       Objective   Physical Exam  Constitutional:       Appearance: Normal appearance.   Cardiovascular:      Rate and Rhythm: Normal rate and regular rhythm.      Heart sounds: No murmur heard.  Pulmonary:      Effort: Pulmonary effort is normal. No respiratory distress.      Breath sounds: Normal breath sounds. No wheezing.   Musculoskeletal:      Cervical back: Neck supple.      Left lower leg: No edema.   Neurological:      Mental Status: He is alert.           Assessment/Plan   Diagnoses and all orders for this visit:  Controlled type 2 diabetes mellitus without complication, without long-term current use of insulin  -     POCT glycosylated hemoglobin (Hb A1C) manually resulted  -     Comprehensive Metabolic Panel; Future  -     CBC and Auto Differential; Future  -     Albumin-Creatinine Ratio, Urine Random; Future  Rhinorrhea  -     Referral to ENT; Future  -     ipratropium (Atrovent) 21 mcg (0.03 %) nasal spray; Administer 2 sprays into each nostril every 12 hours.  Dyspnea on exertion  -     Referral to Cardiology; Future  -     Echocardiogram Stress Test; Future  Leg swelling  -     torsemide (Demadex) 20 mg tablet; Take 1 tablet (20 mg) by mouth once daily.  Wheezing  -     XR chest 2 views; Future  Other orders  -     DOBUTamine " (Dobutrex) 250 mg in dextrose 5% 250 mL (1 mg/mL) infusion (premix)  -     Staff Communication: Dobutamine HIGH Dose Protocol: 1.  INITIATE INFUSION RATE OF DOBUTAMINE 5 MCG/KG/MIN IV FOR 3 MINUTES. HOLD INFUSION IF PATIENT REACHES >85% OF AGE PREDICTED HEART RATE. MONITOR ECG, BLOOD PRESSURE AND ECHOCARDIOGRAM. 2.  Dobu...  -     atropine syringe 0.25-5 mg  -     perflutren lipid microspheres (Definity) injection 0.5-10 mL of dilution  -     Staff Communication: Prior to administration, Definity product must be activated.  First, bring vial to room temperature. Then, shake vial for 45 seconds.  Do not use if the 45 second activation cycle has not been completed. Following activation, ...  -     Insert and maintain peripheral IV; Standing       Qamar Lowe for 80 year old female who presents to the office for 6 month follow up and medication refills.      DM  IO A1C 5.6%  refilled Pioglitazone 30mg daily .   Legs continue to have moderate swelling, mildly reduced circulation  [employer]   reviewed his logs- to be scanned into the chart   eye exam is UTD   Albumin/Cr Ratio:   We reviewed healthy lifestyle choices and changes. Advised to avoid fatty foods such as processed food, sweets; avoid foods heavy in sugar and salt. Maintain regular exercise 20 min daily or a total of 120 min weekly of moderate exercise.     HTN  BP was good at 116/56 mmHg  Physical exam was stable.   refilled isosorbide 30mg daily   Discussed maintaining diets such as DASH to help maintain normal Blood pressure. Encouraged regular exercise for a total of 120min weekly.      Leg swelling   Currently compliant with his diuretic   Feels that his legs are not going down   Feels that he is getting more dyspneic on exertion  Had some increased   PVR was negative 5/24  Hx of HERMINIO  Cardiac silhouetter was wnl on rib xray on   No orthopnea   -concerns for HF  -echocardiogram ordered  -stop lasix  -start torsemide 20 mg daily   -CXR ordered      Frequent Falls   Orthopedic evaluation: neurogenic claudication and lumbar stenosis  Neurology evaluation pt unable to schedule due to next appointment being 5 months away  Injury: nasal  bridge, left ribs and right arm   No numbness, no pain in the legs   Xray: mild to moderate facet disease and spondylosis   PVR was negative   Has been taking ibuprofen for his pain   Rib pain along Rib 8 mid clavicular line to axilla   MRI lumbar and cervical ordered but never completed   Xanax given for pre procedure      Right knee pain  Left knee replaced   OA  CMC joint arthritis   following with ortho     DSL  continue fenofibrate 160mg daily   continue simvastatin 80mg      Leukopenia   persistent leukopenia   plan for heme Intervention if continues     Health Maintenance  follow up in 3 months for DM deshawn Scott DO 03/12/25 2:02 PM        [1]   Current Outpatient Medications:     fenofibrate (Triglide) 160 mg tablet, TAKE 1 TABLET BY MOUTH DAILY, Disp: 90 tablet, Rfl: 1    fluticasone (Flonase) 50 mcg/actuation nasal spray, Administer 2 sprays into affected nostril(s) once daily., Disp: , Rfl:     furosemide (Lasix) 40 mg tablet, TAKE 1 TABLET BY MOUTH DAILY, Disp: 90 tablet, Rfl: 1    ONETOUCH DELICA LANCETS MISC, , Disp: , Rfl:     OneTouch Ultra Test, TEST 2 TIIMES DAILY AS DIRECTED, Disp: 200 strip, Rfl: 2    pioglitazone (Actos) 30 mg tablet, TAKE 1 TABLET BY MOUTH ONCE  DAILY, Disp: 90 tablet, Rfl: 2    simvastatin (Zocor) 80 mg tablet, TAKE 1 TABLET BY MOUTH DAILY, Disp: 100 tablet, Rfl: 2    ALPRAZolam (Xanax) 0.5 mg tablet, Take 1 tablet (0.5 mg) by mouth once daily as needed for anxiety for up to 3 days., Disp: 3 tablet, Rfl: 0    cyclobenzaprine (Flexeril) 10 mg tablet, Take 1 tablet (10 mg) by mouth as needed at bedtime for muscle spasms., Disp: 30 tablet, Rfl: 0    isosorbide dinitrate (Isordil) 30 mg tablet, Take 1 tablet (30 mg) by mouth 4 times a day. (Patient not taking: Reported on  5/28/2025), Disp: , Rfl:

## 2025-05-29 ENCOUNTER — TELEPHONE (OUTPATIENT)
Dept: PRIMARY CARE | Facility: CLINIC | Age: 80
End: 2025-05-29
Payer: MEDICARE

## 2025-05-29 RX ORDER — IPRATROPIUM BROMIDE 21 UG/1
2 SPRAY, METERED NASAL EVERY 12 HOURS
Qty: 30 ML | Refills: 12 | Status: SHIPPED | OUTPATIENT
Start: 2025-05-29 | End: 2026-05-29

## 2025-06-16 ENCOUNTER — TELEPHONE (OUTPATIENT)
Dept: PRIMARY CARE | Facility: CLINIC | Age: 80
End: 2025-06-16
Payer: MEDICARE

## 2025-06-19 ENCOUNTER — HOSPITAL ENCOUNTER (OUTPATIENT)
Dept: RADIOLOGY | Facility: HOSPITAL | Age: 80
Discharge: HOME | End: 2025-06-19
Payer: MEDICARE

## 2025-06-19 ENCOUNTER — APPOINTMENT (OUTPATIENT)
Dept: CARDIOLOGY | Facility: HOSPITAL | Age: 80
End: 2025-06-19
Payer: MEDICARE

## 2025-06-19 ENCOUNTER — HOSPITAL ENCOUNTER (OUTPATIENT)
Dept: CARDIOLOGY | Facility: HOSPITAL | Age: 80
Discharge: HOME | End: 2025-06-19
Payer: MEDICARE

## 2025-06-19 ENCOUNTER — APPOINTMENT (OUTPATIENT)
Dept: CARDIOLOGY | Facility: CLINIC | Age: 80
End: 2025-06-19
Payer: MEDICARE

## 2025-06-19 DIAGNOSIS — R06.09 OTHER FORM OF DYSPNEA: ICD-10-CM

## 2025-06-19 PROCEDURE — 93017 CV STRESS TEST TRACING ONLY: CPT

## 2025-06-19 PROCEDURE — 93016 CV STRESS TEST SUPVJ ONLY: CPT | Performed by: INTERNAL MEDICINE

## 2025-06-19 PROCEDURE — 3430000001 HC RX 343 DIAGNOSTIC RADIOPHARMACEUTICALS: Performed by: STUDENT IN AN ORGANIZED HEALTH CARE EDUCATION/TRAINING PROGRAM

## 2025-06-19 PROCEDURE — 78452 HT MUSCLE IMAGE SPECT MULT: CPT | Performed by: STUDENT IN AN ORGANIZED HEALTH CARE EDUCATION/TRAINING PROGRAM

## 2025-06-19 PROCEDURE — 78452 HT MUSCLE IMAGE SPECT MULT: CPT

## 2025-06-19 PROCEDURE — 93018 CV STRESS TEST I&R ONLY: CPT | Performed by: INTERNAL MEDICINE

## 2025-06-19 PROCEDURE — A9502 TC99M TETROFOSMIN: HCPCS | Performed by: STUDENT IN AN ORGANIZED HEALTH CARE EDUCATION/TRAINING PROGRAM

## 2025-06-19 PROCEDURE — 2500000004 HC RX 250 GENERAL PHARMACY W/ HCPCS (ALT 636 FOR OP/ED): Performed by: INTERNAL MEDICINE

## 2025-06-19 RX ORDER — REGADENOSON 0.08 MG/ML
0.4 INJECTION, SOLUTION INTRAVENOUS ONCE
Status: COMPLETED | OUTPATIENT
Start: 2025-06-19 | End: 2025-06-19

## 2025-06-19 RX ADMIN — TETROFOSMIN 35.5 MILLICURIE: 0.23 INJECTION, POWDER, LYOPHILIZED, FOR SOLUTION INTRAVENOUS at 09:18

## 2025-06-19 RX ADMIN — REGADENOSON 0.4 MG: 0.08 INJECTION, SOLUTION INTRAVENOUS at 09:28

## 2025-06-19 RX ADMIN — TETROFOSMIN 12 MILLICURIE: 0.23 INJECTION, POWDER, LYOPHILIZED, FOR SOLUTION INTRAVENOUS at 08:00

## 2025-06-24 ENCOUNTER — OFFICE VISIT (OUTPATIENT)
Dept: CARDIOLOGY | Facility: CLINIC | Age: 80
End: 2025-06-24
Payer: MEDICARE

## 2025-06-24 VITALS
DIASTOLIC BLOOD PRESSURE: 84 MMHG | WEIGHT: 285.5 LBS | HEIGHT: 71 IN | HEART RATE: 59 BPM | OXYGEN SATURATION: 93 % | BODY MASS INDEX: 39.97 KG/M2 | SYSTOLIC BLOOD PRESSURE: 149 MMHG

## 2025-06-24 DIAGNOSIS — R06.09 DYSPNEA ON EXERTION: Primary | ICD-10-CM

## 2025-06-24 DIAGNOSIS — R22.43 LOCALIZED SWELLING OF BOTH LOWER LEGS: ICD-10-CM

## 2025-06-24 DIAGNOSIS — I49.3 PVC (PREMATURE VENTRICULAR CONTRACTION): ICD-10-CM

## 2025-06-24 DIAGNOSIS — I10 ESSENTIAL (PRIMARY) HYPERTENSION: ICD-10-CM

## 2025-06-24 LAB
ATRIAL RATE: 60 BPM
Q ONSET: 221 MS
QRS COUNT: 10 BEATS
QRS DURATION: 108 MS
QT INTERVAL: 446 MS
QTC CALCULATION(BAZETT): 446 MS
QTC FREDERICIA: 446 MS
R AXIS: -1 DEGREES
T AXIS: 18 DEGREES
T OFFSET: 444 MS
VENTRICULAR RATE: 60 BPM

## 2025-06-24 PROCEDURE — 1126F AMNT PAIN NOTED NONE PRSNT: CPT | Performed by: HOSPITALIST

## 2025-06-24 PROCEDURE — 1036F TOBACCO NON-USER: CPT | Performed by: HOSPITALIST

## 2025-06-24 PROCEDURE — 93005 ELECTROCARDIOGRAM TRACING: CPT | Performed by: HOSPITALIST

## 2025-06-24 PROCEDURE — 1159F MED LIST DOCD IN RCRD: CPT | Performed by: HOSPITALIST

## 2025-06-24 PROCEDURE — 99213 OFFICE O/P EST LOW 20 MIN: CPT

## 2025-06-24 PROCEDURE — 99205 OFFICE O/P NEW HI 60 MIN: CPT | Performed by: HOSPITALIST

## 2025-06-24 PROCEDURE — 3077F SYST BP >= 140 MM HG: CPT | Performed by: HOSPITALIST

## 2025-06-24 PROCEDURE — 1160F RVW MEDS BY RX/DR IN RCRD: CPT | Performed by: HOSPITALIST

## 2025-06-24 PROCEDURE — 3079F DIAST BP 80-89 MM HG: CPT | Performed by: HOSPITALIST

## 2025-06-24 RX ORDER — ASPIRIN 81 MG/1
81 TABLET ORAL DAILY
Qty: 90 TABLET | Refills: 3 | Status: SHIPPED | OUTPATIENT
Start: 2025-06-24 | End: 2026-06-24

## 2025-06-24 RX ORDER — METRONIDAZOLE 7.5 MG/G
CREAM TOPICAL
COMMUNITY
Start: 2025-06-03

## 2025-06-24 ASSESSMENT — PATIENT HEALTH QUESTIONNAIRE - PHQ9
SUM OF ALL RESPONSES TO PHQ9 QUESTIONS 1 AND 2: 0
2. FEELING DOWN, DEPRESSED OR HOPELESS: NOT AT ALL
1. LITTLE INTEREST OR PLEASURE IN DOING THINGS: NOT AT ALL

## 2025-06-24 ASSESSMENT — PAIN SCALES - GENERAL: PAINLEVEL_OUTOF10: 0-NO PAIN

## 2025-06-24 NOTE — PATIENT INSTRUCTIONS
Please make sure you take baby aspirin 81 mg once daily for life.    We are going to plan echocardiogram and 48-hour heart monitor for your shortness of breath.  Please call us if you do not hear from us within few days of your testing, our office number is [phone]    Your blood pressure is elevated today, high blood pressure can make you feel short of breath.  Please check your blood pressure numbers at home, morning and evening, write them down, and share with us or  PCP and 1 week.  Ideally blood pressure numbers would be around 1/21/1932 over 70 to 80 mmHg.    Your leg swelling is likely related to venous insufficiency [veins have hard time getting the blood back to your heart from your legs].  Please keep your legs elevated above your heart level all the time if not walking or eating.  Please wear compression stockings moderate pressure 20 to 30 mmHg all day and take them off when you go to bed.  Please call your local drug [place], check when their fitter comes by, you can be fitted for a free.    Please do your best to exercise a regular basis, please add aerobic cardio exercises, start at 5 to 10 minutes a day, ideally 30 minutes 5 times a week.    Please do your best to lose weight through diet and exercise.  Look up Mediterranean diet.  Skip breakfast if you can,  a window of 10 hours to eat a day [10 AM to 6 PM for example], do not eat after 6 PM at night.  Avoid food rich with sugar and carbohydrates like bread, pasta, rice, and potatoes.  Count your calories and try to be 500-calorie deficient a day, this way would lose 1 pound a week [3500 juwan equals to 1 pound].    Will see back in 6 weeks, call us meanwhile if you have any questions.

## 2025-06-24 NOTE — PROGRESS NOTES
Subjective   Qamar Lowe Jr. is a 80 y.o. male with PMH of self-reported CAD status post remote stenting 30-4 years ago, obesity [BMI 40], HERMINIO, HLD, diabetes, arthritis, depression, and other comorbidities, who is here to establish cardiac care and for evaluation of THOMAS.  Patient used to follow-up with Dr. Marina.  Patient complains of THOMAS for the last 4 months, no chest pressure.  He can climb 1 flight of stairs but has a trouble if he was carrying something, he also has trouble with walking an incline.  No problem walking flat level.  He denies any orthopnea, PND, dizziness, or palpitation.  He uses CPAP every single night.  He denies any recent weight gain.  He complains of his legs being swollen.    Patient is on fenofibrate, simvastatin 80 mg, torsemide 40 mg, and other noncardiac meds.  Patient is not on any aspirin.  Today's blood pressure is 149/84.  Heart rate is 59.    Most recent blood work from 5/21/2025 with creatinine of 0.82, hemoglobin of 14.3.  Lipid panel from 11/12/2024 with HDL of 47, LDL of 74, and triglyceride of 77.    EKG from 6/20/2025, reviewed, showed normal sinus rhythm with borderline OK interval around 200 ms, and PVCs.    Nuclear stress test on 6/19/2025:  1. Negative myocardial perfusion study without evidence of inducible myocardial  ischemia or prior infarction. There is an apical thinning, a normal variant  2. The left ventricle is normal in size.  3. Normal LV wall motion with a post-stress LV EF estimated at 56%.  4. Low-dose CT demonstrates bilateral trace pleural effusions.    TTE on 12/14/2020:  1. The left ventricular systolic function is normal with a 60-65% estimated ejection fraction.   2. Spectral Doppler shows an impaired relaxation pattern of left ventricular diastolic filling.   3. The estimated PASP is 32 mmHg.   4. There is plaque visualized in the ascending aorta.    Review of Systems  ROS is negative other than in HPI.      Objective   Physical Exam  General:  Obese, NAD  HEENT: IEOM, PERRL   Neck: Difficult exam due to body habitus but no no JVD or carotid bruit  Lungs: CTAB  Heart: RRR, normal S1 and S2, no loud murmurs  Abdomen: Soft, nontender, positive bowel sounds  Extremities: Mostly nonpitting edema.    Neurologic: No FND  Psychiatric: Normal mood and affect    Assessment/Plan   1-THOMAS:  -Not related to his body habitus and deconditioning.  He is euvolemic on exam.  -Nuclear stress test from 6/19/2025 was negative for ischemia.  Recent hemoglobin was normal.  He uses a CPAP regular basis.  -EKG from 6/20/2025 showed normal sinus rhythm with borderline MI interval around 200 ms, and PVCs.  -Will obtain an echocardiogram and 48-hour heart monitor [PVCs on EKG].  -Patient was counseled about the importance of losing weight.  - Emphasized the importance of better BP control    2-leg swelling:  -Mostly nonpitting edema, likely related to his body habitus and venous insufficiency, there is no evidence of volume overload by JVP on exam.  -Compression stocking and legs elevation.    3-CAD:  -Status post remote stenting.  -Lipid panel from 11/12/2024 with HDL of 47, LDL of 74, and triglyceride of 77.  -Emphasized the patient the importance of taking aspirin 81 mg once daily without interruption, continue simvastatin and fenofibrate.    4-.HTN:  -Suboptimally controlled, patient check his BP at home and share result with us.    RTC in 4 to 6 weeks.    Mily Michael MD

## 2025-06-27 ENCOUNTER — HOSPITAL ENCOUNTER (OUTPATIENT)
Dept: CARDIOLOGY | Facility: CLINIC | Age: 80
Discharge: HOME | End: 2025-06-27
Payer: MEDICARE

## 2025-06-27 DIAGNOSIS — I49.3 PVC (PREMATURE VENTRICULAR CONTRACTION): ICD-10-CM

## 2025-06-27 PROCEDURE — 93225 XTRNL ECG REC<48 HRS REC: CPT

## 2025-07-01 ENCOUNTER — TELEPHONE (OUTPATIENT)
Dept: CARDIOLOGY | Facility: CLINIC | Age: 80
End: 2025-07-01
Payer: MEDICARE

## 2025-07-01 DIAGNOSIS — I49.3 PVC (PREMATURE VENTRICULAR CONTRACTION): Primary | ICD-10-CM

## 2025-07-01 NOTE — TELEPHONE ENCOUNTER
Unable to retrieve EM data. TCT patient and explained monitor will need to be repeated. States understanding and agreement. Number given to schedule. New order pended and forwarded for signature

## 2025-07-08 ENCOUNTER — HOSPITAL ENCOUNTER (OUTPATIENT)
Dept: CARDIOLOGY | Facility: CLINIC | Age: 80
Discharge: HOME | End: 2025-07-08
Payer: MEDICARE

## 2025-07-08 DIAGNOSIS — I49.3 PVC (PREMATURE VENTRICULAR CONTRACTION): ICD-10-CM

## 2025-07-08 DIAGNOSIS — R06.09 DYSPNEA ON EXERTION: ICD-10-CM

## 2025-07-08 LAB
AORTIC VALVE PEAK VELOCITY: 1.06 M/S
AV PEAK GRADIENT: 5 MMHG
AVA (PEAK VEL): 2.82 CM2
EJECTION FRACTION APICAL 4 CHAMBER: 66.8
EJECTION FRACTION: 63 %
LEFT ATRIUM VOLUME AREA LENGTH INDEX BSA: 24.8 ML/M2
LEFT VENTRICLE INTERNAL DIMENSION DIASTOLE: 5.33 CM (ref 3.5–6)
LEFT VENTRICULAR OUTFLOW TRACT DIAMETER: 2.12 CM
MITRAL VALVE E/A RATIO: 0.69
RIGHT VENTRICLE FREE WALL PEAK S': 10.08 CM/S
RIGHT VENTRICLE PEAK SYSTOLIC PRESSURE: 38 MMHG
TRICUSPID ANNULAR PLANE SYSTOLIC EXCURSION: 2 CM

## 2025-07-08 PROCEDURE — 93306 TTE W/DOPPLER COMPLETE: CPT | Performed by: INTERNAL MEDICINE

## 2025-07-08 PROCEDURE — C8929 TTE W OR WO FOL WCON,DOPPLER: HCPCS

## 2025-07-08 PROCEDURE — 93225 XTRNL ECG REC<48 HRS REC: CPT

## 2025-07-08 PROCEDURE — 2500000004 HC RX 250 GENERAL PHARMACY W/ HCPCS (ALT 636 FOR OP/ED): Performed by: HOSPITALIST

## 2025-07-08 RX ADMIN — PERFLUTREN 4 ML OF DILUTION: 6.52 INJECTION, SUSPENSION INTRAVENOUS at 11:16

## 2025-07-09 ENCOUNTER — APPOINTMENT (OUTPATIENT)
Dept: OTOLARYNGOLOGY | Facility: CLINIC | Age: 80
End: 2025-07-09
Payer: MEDICARE

## 2025-07-09 VITALS — HEIGHT: 71 IN | WEIGHT: 286 LBS | BODY MASS INDEX: 40.04 KG/M2 | TEMPERATURE: 96.6 F

## 2025-07-09 DIAGNOSIS — R09.A2 GLOBUS SENSATION: ICD-10-CM

## 2025-07-09 DIAGNOSIS — R05.9 COUGH, UNSPECIFIED TYPE: Primary | ICD-10-CM

## 2025-07-09 DIAGNOSIS — K21.9 LARYNGOPHARYNGEAL REFLUX (LPR): ICD-10-CM

## 2025-07-09 DIAGNOSIS — J34.89 RHINORRHEA: ICD-10-CM

## 2025-07-09 PROCEDURE — 99203 OFFICE O/P NEW LOW 30 MIN: CPT

## 2025-07-09 PROCEDURE — 31575 DIAGNOSTIC LARYNGOSCOPY: CPT

## 2025-07-09 PROCEDURE — 1159F MED LIST DOCD IN RCRD: CPT

## 2025-07-09 RX ORDER — OMEPRAZOLE 40 MG/1
CAPSULE, DELAYED RELEASE ORAL
Qty: 30 CAPSULE | Refills: 6 | Status: SHIPPED | OUTPATIENT
Start: 2025-07-09

## 2025-07-09 NOTE — PROGRESS NOTES
"Chief Complaint   Patient presents with    New Patient Visit     N/P congestion, coughing     HPI:  Qamar Lowe Jr. is a 80 y.o. male he is accompanied by his wife Kirstin.  He complains of clear runny nose which is worse when he is eating, globus sensation, postnasal drip, throat clearing and cough.  He admits these symptoms have been getting worse over the past 6 months.  Reports he uses CPAP for HERMINIO, for many years.  Denies sore throat or difficulty swallowing.  Recently started Atrovent 0.3% every 12 hours as needed for rhinorrhea.    PMH:  Medical History[1]  Surgical History[2]      Medications:   Current Medications[3]     Allergies:  Allergies[4]     ROS:  Review of systems normal unless stated otherwise in the HPI and/or PMH.    Physical Exam:  Temperature 35.9 °C (96.6 °F), height 1.803 m (5' 11\"), weight 130 kg (286 lb). Body mass index is 39.89 kg/m².     GENERAL APPEARANCE: Well developed and well nourished.  Alert and oriented in no acute distress.  Normal vocal quality.      HEAD/FACE: No erythema or edema or facial tenderness.  Normal facial nerve function bilaterally.    EAR:       EXTERNAL: Normal pinnas and external auditory canals without lesion or obstructing wax.       MIDDLE EAR: Tympanic membranes intact and mobile with normal landmarks.  Middle ear space appears well aerated.       TUBE STATUS: N/A       MASTOID CAVITY: N/A       HEARING: Gross hearing assessment is within normal limits.      NOSE:       VISUALIZED USING: Anterior rhinoscopy with headlight and nasal speculum.       DORSUM: Midline, nontraumatic appearance.       MUCOSA: Normal-appearing.       SECRETIONS: Normal.       SEPTUM: Midline and deviation to the left nonobstructing.       INFERIOR TURBINATES: Normal.       MIDDLE TURBINATES/MEATUS: N/A       BLEEDING: N/A         ORAL CAVITY/PHARYNX:       TEETH: Adequate dentition.       TONGUE: No mass or lesion.  Normal mobility.       FLOOR OF MOUTH: No mass or lesion.       " PALATE: Normal hard palate, soft palate, and uvula.       OROPHARYNX: Normal without mass or lesion.       BUCCAL MUCOSA/GBS: Normal without mass or lesion.       LIPS: Normal.    LARYNX/HYPOPHARYNX/NASOPHARYNX: Flexible laryngoscopy was performed after verbal consent obtained. Applied decongestant and topical anesthetic. The flexible laryngoscopy was placed through the nasal cavity revealing normal nasopharynx, normal oropharynx, normal hypopharynx and normal larynx. Normal bilateral vocal cord mobility without any mucosal masses or lesions.  Posterior commissure erythema present    NECK: No palpable masses or abnormal adenopathy.  Trachea is midline.    THYROID: No thyromegaly or palpable nodule.    SALIVARY GLANDS: Normal bilateral parotid and submandibular glands by inspection and palpation.    TMJ's: Normal.    NEURO: Cranial nerve exam grossly normal bilaterally.       Assessment/Plan   Qamar was seen today for new patient visit.  Diagnoses and all orders for this visit:  Cough, unspecified type (Primary)  Rhinorrhea  -     Referral to ENT  Laryngopharyngeal reflux (LPR)  -     omeprazole (PriLOSEC) 40 mg DR capsule; Take 1 capsule once daily 30-45 minutes before a meal.  Globus sensation     I provide education on laryngopharyngeal reflux including symptoms, style modifications and foods to avoid-handout provided also.  Qamar will start omeprazole already milligrams on an empty stomach.  He will increase Atrovent nasal spray to every 6 hours as needed for rhinorrhea.  He will return in 6 to 8 weeks for a recheck.  Follow up in about 8 weeks (around 9/3/2025).     Uzma Oconnor, LEANNE-CNP         [1]   Past Medical History:  Diagnosis Date    Encounter for examination of eyes and vision without abnormal findings     Diabetic eye exam   [2] History reviewed. No pertinent surgical history.  [3]   Current Outpatient Medications:     aspirin 81 mg EC tablet, Take 1 tablet (81 mg) by mouth once daily., Disp: 90  tablet, Rfl: 3    fenofibrate (Triglide) 160 mg tablet, TAKE 1 TABLET BY MOUTH DAILY, Disp: 90 tablet, Rfl: 1    ipratropium (Atrovent) 21 mcg (0.03 %) nasal spray, Administer 2 sprays into each nostril every 12 hours., Disp: 30 mL, Rfl: 12    metroNIDAZOLE (Metrocream) 0.75 % cream, APPLY TO FACE TWICE A DAY, Disp: , Rfl:     ONETOUCH DELICA LANCETS MISC, , Disp: , Rfl:     OneTouch Ultra Test, TEST 2 TIIMES DAILY AS DIRECTED, Disp: 200 strip, Rfl: 2    pioglitazone (Actos) 30 mg tablet, TAKE 1 TABLET BY MOUTH ONCE  DAILY, Disp: 90 tablet, Rfl: 2    simvastatin (Zocor) 80 mg tablet, TAKE 1 TABLET BY MOUTH DAILY, Disp: 100 tablet, Rfl: 2    torsemide (Demadex) 20 mg tablet, Take 1 tablet (20 mg) by mouth once daily., Disp: 90 tablet, Rfl: 1    ALPRAZolam (Xanax) 0.5 mg tablet, Take 1 tablet (0.5 mg) by mouth once daily as needed for anxiety for up to 3 days., Disp: 3 tablet, Rfl: 0    cyclobenzaprine (Flexeril) 10 mg tablet, Take 1 tablet (10 mg) by mouth as needed at bedtime for muscle spasms., Disp: 30 tablet, Rfl: 0    fluticasone (Flonase) 50 mcg/actuation nasal spray, Administer 2 sprays into affected nostril(s) once daily. (Patient not taking: Reported on 7/9/2025), Disp: , Rfl:     isosorbide dinitrate (Isordil) 30 mg tablet, Take 1 tablet (30 mg) by mouth 4 times a day. (Patient not taking: Reported on 5/28/2025), Disp: , Rfl:     omeprazole (PriLOSEC) 40 mg DR capsule, Take 1 capsule once daily 30-45 minutes before a meal., Disp: 30 capsule, Rfl: 6  [4]   Allergies  Allergen Reactions    Ketoprofen Unknown    Phenylephrine Hcl Unknown    Simvastatin Other    Metronidazole Rash

## 2025-07-10 LAB — BODY SURFACE AREA: 2.55 M2

## 2025-07-14 ENCOUNTER — APPOINTMENT (OUTPATIENT)
Dept: CARDIOLOGY | Facility: CLINIC | Age: 80
End: 2025-07-14
Payer: MEDICARE

## 2025-07-14 ENCOUNTER — TELEPHONE (OUTPATIENT)
Dept: PRIMARY CARE | Facility: CLINIC | Age: 80
End: 2025-07-14
Payer: MEDICARE

## 2025-07-14 DIAGNOSIS — E11.9 CONTROLLED TYPE 2 DIABETES MELLITUS WITHOUT COMPLICATION, WITHOUT LONG-TERM CURRENT USE OF INSULIN: Primary | ICD-10-CM

## 2025-07-14 RX ORDER — INSULIN PUMP SYRINGE, 3 ML
1 EACH MISCELLANEOUS DAILY
Qty: 1 KIT | Refills: 0 | Status: SHIPPED | OUTPATIENT
Start: 2025-07-14 | End: 2025-07-15

## 2025-07-14 RX ORDER — BLOOD SUGAR DIAGNOSTIC
1 STRIP MISCELLANEOUS DAILY
Qty: 100 STRIP | Refills: 3 | Status: SHIPPED | OUTPATIENT
Start: 2025-07-14

## 2025-07-14 RX ORDER — INSULIN PUMP SYRINGE, 3 ML
EACH MISCELLANEOUS
Qty: 1 EACH | Refills: 0 | Status: SHIPPED | OUTPATIENT
Start: 2025-07-14 | End: 2025-07-14

## 2025-07-14 RX ORDER — BLOOD SUGAR DIAGNOSTIC
1 STRIP MISCELLANEOUS DAILY
Qty: 100 STRIP | Refills: 3 | Status: SHIPPED | OUTPATIENT
Start: 2025-07-14 | End: 2025-07-14

## 2025-07-16 DIAGNOSIS — E78.5 HYPERLIPIDEMIA, UNSPECIFIED HYPERLIPIDEMIA TYPE: ICD-10-CM

## 2025-07-17 RX ORDER — FENOFIBRATE 160 MG/1
160 TABLET ORAL DAILY
Qty: 100 TABLET | Refills: 2 | Status: SHIPPED | OUTPATIENT
Start: 2025-07-17

## 2025-07-21 ENCOUNTER — TELEPHONE (OUTPATIENT)
Dept: PRIMARY CARE | Facility: CLINIC | Age: 80
End: 2025-07-21
Payer: MEDICARE

## 2025-07-21 LAB — BODY SURFACE AREA: 2.55 M2

## 2025-07-22 ENCOUNTER — OFFICE VISIT (OUTPATIENT)
Dept: CARDIOLOGY | Facility: CLINIC | Age: 80
End: 2025-07-22
Payer: MEDICARE

## 2025-07-22 VITALS
WEIGHT: 292.1 LBS | HEIGHT: 71 IN | OXYGEN SATURATION: 96 % | BODY MASS INDEX: 40.89 KG/M2 | SYSTOLIC BLOOD PRESSURE: 164 MMHG | DIASTOLIC BLOOD PRESSURE: 76 MMHG | HEART RATE: 35 BPM

## 2025-07-22 DIAGNOSIS — I10 ESSENTIAL (PRIMARY) HYPERTENSION: Primary | ICD-10-CM

## 2025-07-22 DIAGNOSIS — I49.3 PVC (PREMATURE VENTRICULAR CONTRACTION): ICD-10-CM

## 2025-07-22 DIAGNOSIS — R06.09 DOE (DYSPNEA ON EXERTION): ICD-10-CM

## 2025-07-22 PROCEDURE — 3077F SYST BP >= 140 MM HG: CPT | Performed by: HOSPITALIST

## 2025-07-22 PROCEDURE — 99213 OFFICE O/P EST LOW 20 MIN: CPT

## 2025-07-22 PROCEDURE — 1159F MED LIST DOCD IN RCRD: CPT | Performed by: HOSPITALIST

## 2025-07-22 PROCEDURE — 99214 OFFICE O/P EST MOD 30 MIN: CPT | Performed by: HOSPITALIST

## 2025-07-22 PROCEDURE — 1126F AMNT PAIN NOTED NONE PRSNT: CPT | Performed by: HOSPITALIST

## 2025-07-22 PROCEDURE — 1160F RVW MEDS BY RX/DR IN RCRD: CPT | Performed by: HOSPITALIST

## 2025-07-22 PROCEDURE — 3078F DIAST BP <80 MM HG: CPT | Performed by: HOSPITALIST

## 2025-07-22 PROCEDURE — 1036F TOBACCO NON-USER: CPT | Performed by: HOSPITALIST

## 2025-07-22 RX ORDER — AMLODIPINE BESYLATE 5 MG/1
5 TABLET ORAL DAILY
Qty: 30 TABLET | Refills: 11 | Status: SHIPPED | OUTPATIENT
Start: 2025-07-22 | End: 2026-07-22

## 2025-07-22 ASSESSMENT — PAIN SCALES - GENERAL: PAINLEVEL_OUTOF10: 0-NO PAIN

## 2025-07-22 NOTE — PROGRESS NOTES
Subjective   Qamar Lowe Jr. is a 80 y.o. male with PMH of self-reported CAD status post remote stenting 30-4 years ago, obesity [BMI 40], HERMINIO, HLD, diabetes, arthritis, depression, and other comorbidities, who some initially for evaluation of THOMAS.  Patient used to follow-up with Dr. Marina.  Patient complains of THOMAS for the last 4 months, no chest pressure.  He can climb 1 flight of stairs but has a trouble if he was carrying something, he also has trouble with walking an incline.  No problem walking flat level.  He denies any orthopnea, PND, dizziness, or palpitation.  He uses CPAP every single night.  He denies any recent weight gain.  He complains of his legs being swollen.     Patient is on aspirin 81, fenofibrate, simvastatin 80 mg, torsemide 20 mg, Lasix 40 mg, and other noncardiac meds.  Per last PCP note, he was supposed to stop his Lasix.  Today's blood pressure is 164/76, heart rate 35 related to PVCs.  Patient brought a log of his home BP measurements, his SBP is anywhere between 120-160.     Most recent blood work from 5/21/2025 with creatinine of 0.82, hemoglobin of 14.3.  Lipid panel from 11/12/2024 with HDL of 47, LDL of 74, and triglyceride of 77.     Heart monitor between 7/8 and 7/11/2025 with sinus rhythm, minimum 35, maximum 94, average 61 bpm.  3% PVCs, 1% PACs.  2 triggers and one of them correlated to PVC.    TTE on 7/8/2025:   1. The left ventricular systolic function is normal with a visually estimated ejection fraction of 60-65%.   2. No regional left ventricular wall motion abnormalities.   3. Spectral Doppler shows an abnormal pattern of left ventricular diastolic filling.   4. Mild mitral valve regurgitation.   5. Mild tricuspid regurgitation visualized.   6. The Doppler estimated RVSP is mildly elevated at 38 mmHg. CVP 3 mmHg.    EKG from 6/20/2025, reviewed, showed normal sinus rhythm with borderline NJ interval around 200 ms, and PVCs.     Nuclear stress test on 6/19/2025:  1.  Negative myocardial perfusion study without evidence of inducible myocardial  ischemia or prior infarction. There is an apical thinning, a normal variant  2. The left ventricle is normal in size.  3. Normal LV wall motion with a post-stress LV EF estimated at 56%.  4. Low-dose CT demonstrates bilateral trace pleural effusions.      TTE on 12/14/2020:  1. The left ventricular systolic function is normal with a 60-65% estimated ejection fraction.   2. Spectral Doppler shows an impaired relaxation pattern of left ventricular diastolic filling.   3. The estimated PASP is 32 mmHg.   4. There is plaque visualized in the ascending aorta.     Review of Systems  ROS is negative other than in HPI.      Objective   Physical Exam  General: Obese, NAD  HEENT: IEOM, PERRL   Neck: Difficult exam due to body habitus but no no JVD or carotid bruit  Lungs: CTAB  Heart: RRR, normal S1 and S2, no loud murmurs  Abdomen: Soft, nontender, positive bowel sounds  Extremities: Mixed of pitting and nonpitting edema.    Neurologic: No FND  Psychiatric: Normal mood and affect    Assessment/Plan   1-THOMAS:  -Most likely related to his body habitus and deconditioning.  He is euvolemic on exam.  -Nuclear stress test from 6/19/2025 was negative for ischemia.  Recent hemoglobin was normal.  He uses a CPAP regular basis.  -EKG from 6/20/2025 showed normal sinus rhythm with borderline IA interval around 200 ms, and PVCs.  -TTE was unremarkable.  Heart monitor with 3% PVCs.  -Patient was counseled about the importance of losing weight and exercise as tolerated.  -Emphasized the importance of better BP control     2-leg swelling:  - Mixed pitting and nonpitting edema, likely related to his body habitus and venous insufficiency, there is no evidence of volume overload by JVP on exam.  -Compression stocking, exercise, and and legs elevation.     3-CAD:  -Status post remote stenting.  -Lipid panel from 11/12/2024 with HDL of 47, LDL of 74, and triglyceride  of 77.  -Emphasized the patient the importance of taking aspirin 81 mg once daily without interruption, continue simvastatin and fenofibrate.     4-.HTN:  -Suboptimally controlled, will add amlodipine 5 mg once daily, patient to check his BP at home and share result with us.  Low-salt diet.    RTC in 1 year.    Mily Michael MD

## 2025-07-22 NOTE — TELEPHONE ENCOUNTER
Called pharmacy and they will be sending the meter to patient    There are no preventive care reminders to display for this patient.    Patient is up to date, no discussion needed.

## 2025-07-22 NOTE — PATIENT INSTRUCTIONS
Thank you so much for visiting us today.    Your last echocardiogram was unremarkable.    Your blood pressure is suboptimally controlled, we are going to start you on amlodipine 5 mg once daily.  Please check your blood pressure at home morning and evening, write numbers down and share with us in 1 week at [phone].  Ideally, your blood pressure numbers should be around 120-130/70-80. Please follow DASH diet.    I would recommend against being on 2 water pill [torsemide and Lasix].  It looks like from your last primary care physician note, you were supposed to stop your Lasix and start torsemide.  Please call her for clarification .    Please call us at [phone] if you have any questions.

## 2025-08-04 ENCOUNTER — TELEPHONE (OUTPATIENT)
Dept: CARDIOLOGY | Facility: CLINIC | Age: 80
End: 2025-08-04
Payer: MEDICARE

## 2025-08-04 DIAGNOSIS — I10 ESSENTIAL (PRIMARY) HYPERTENSION: ICD-10-CM

## 2025-08-05 DIAGNOSIS — M79.89 LEG SWELLING: ICD-10-CM

## 2025-08-06 RX ORDER — AMLODIPINE BESYLATE 5 MG/1
5 TABLET ORAL DAILY
Qty: 90 TABLET | Refills: 3 | Status: SHIPPED | OUTPATIENT
Start: 2025-08-06 | End: 2026-08-06

## 2025-08-06 RX ORDER — TORSEMIDE 20 MG/1
20 TABLET ORAL DAILY
Qty: 100 TABLET | Refills: 2 | Status: SHIPPED | OUTPATIENT
Start: 2025-08-06

## 2025-08-06 NOTE — TELEPHONE ENCOUNTER
TCT patient who states he is doing well with the amlodipine. Requests 90 day supply to mail order pharmacy. Instructed to call with any other questions/concerns.

## 2025-08-21 LAB
ALBUMIN SERPL-MCNC: 3.7 G/DL (ref 3.6–5.1)
ALBUMIN/CREAT UR: 17 MG/G CREAT
ALP SERPL-CCNC: 39 U/L (ref 35–144)
ALT SERPL-CCNC: 10 U/L (ref 9–46)
ANION GAP SERPL CALCULATED.4IONS-SCNC: 5 MMOL/L (CALC) (ref 7–17)
AST SERPL-CCNC: 14 U/L (ref 10–35)
BASOPHILS # BLD AUTO: 11 CELLS/UL (ref 0–200)
BASOPHILS NFR BLD AUTO: 0.3 %
BILIRUB SERPL-MCNC: 0.5 MG/DL (ref 0.2–1.2)
BUN SERPL-MCNC: 18 MG/DL (ref 7–25)
CALCIUM SERPL-MCNC: 8.6 MG/DL (ref 8.6–10.3)
CHLORIDE SERPL-SCNC: 110 MMOL/L (ref 98–110)
CO2 SERPL-SCNC: 29 MMOL/L (ref 20–32)
CREAT SERPL-MCNC: 0.95 MG/DL (ref 0.7–1.22)
CREAT UR-MCNC: 162 MG/DL (ref 20–320)
EGFRCR SERPLBLD CKD-EPI 2021: 80 ML/MIN/1.73M2
EOSINOPHIL # BLD AUTO: 270 CELLS/UL (ref 15–500)
EOSINOPHIL NFR BLD AUTO: 7.5 %
ERYTHROCYTE [DISTWIDTH] IN BLOOD BY AUTOMATED COUNT: 13.9 % (ref 11–15)
GLUCOSE SERPL-MCNC: 113 MG/DL (ref 65–99)
HCT VFR BLD AUTO: 44.1 % (ref 38.5–50)
HGB BLD-MCNC: 13.9 G/DL (ref 13.2–17.1)
LYMPHOCYTES # BLD AUTO: 788 CELLS/UL (ref 850–3900)
LYMPHOCYTES NFR BLD AUTO: 21.9 %
MCH RBC QN AUTO: 29.4 PG (ref 27–33)
MCHC RBC AUTO-ENTMCNC: 31.5 G/DL (ref 32–36)
MCV RBC AUTO: 93.2 FL (ref 80–100)
MICROALBUMIN UR-MCNC: 2.8 MG/DL
MONOCYTES # BLD AUTO: 367 CELLS/UL (ref 200–950)
MONOCYTES NFR BLD AUTO: 10.2 %
NEUTROPHILS # BLD AUTO: 2164 CELLS/UL (ref 1500–7800)
NEUTROPHILS NFR BLD AUTO: 60.1 %
PLATELET # BLD AUTO: 194 THOUSAND/UL (ref 140–400)
PMV BLD REES-ECKER: 10.4 FL (ref 7.5–12.5)
POTASSIUM SERPL-SCNC: 4.1 MMOL/L (ref 3.5–5.3)
PROT SERPL-MCNC: 5.8 G/DL (ref 6.1–8.1)
RBC # BLD AUTO: 4.73 MILLION/UL (ref 4.2–5.8)
SODIUM SERPL-SCNC: 144 MMOL/L (ref 135–146)
WBC # BLD AUTO: 3.6 THOUSAND/UL (ref 3.8–10.8)

## 2025-08-25 ENCOUNTER — OFFICE VISIT (OUTPATIENT)
Dept: PRIMARY CARE | Facility: CLINIC | Age: 80
End: 2025-08-25
Payer: MEDICARE

## 2025-08-25 ASSESSMENT — ENCOUNTER SYMPTOMS
OCCASIONAL FEELINGS OF UNSTEADINESS: 1
LOSS OF SENSATION IN FEET: 0
DEPRESSION: 0

## 2025-08-27 ENCOUNTER — APPOINTMENT (OUTPATIENT)
Dept: PRIMARY CARE | Facility: CLINIC | Age: 80
End: 2025-08-27
Payer: MEDICARE

## 2025-09-03 ENCOUNTER — APPOINTMENT (OUTPATIENT)
Dept: OTOLARYNGOLOGY | Facility: CLINIC | Age: 80
End: 2025-09-03
Payer: MEDICARE

## 2025-09-05 ENCOUNTER — TELEMEDICINE (OUTPATIENT)
Dept: PHARMACY | Facility: HOSPITAL | Age: 80
End: 2025-09-05
Payer: MEDICARE

## 2025-09-05 DIAGNOSIS — E11.8 CONTROLLED TYPE 2 DIABETES MELLITUS WITH COMPLICATION, WITHOUT LONG-TERM CURRENT USE OF INSULIN (MULTI): ICD-10-CM

## 2025-09-05 RX ORDER — TIRZEPATIDE 2.5 MG/.5ML
2.5 INJECTION, SOLUTION SUBCUTANEOUS WEEKLY
Qty: 2 ML | Refills: 0 | Status: SHIPPED | OUTPATIENT
Start: 2025-09-05

## 2025-09-26 ENCOUNTER — APPOINTMENT (OUTPATIENT)
Dept: PHARMACY | Facility: HOSPITAL | Age: 80
End: 2025-09-26
Payer: MEDICARE

## 2025-10-21 ENCOUNTER — APPOINTMENT (OUTPATIENT)
Dept: AUDIOLOGY | Facility: CLINIC | Age: 80
End: 2025-10-21
Payer: MEDICARE

## 2025-12-02 ENCOUNTER — APPOINTMENT (OUTPATIENT)
Dept: PRIMARY CARE | Facility: CLINIC | Age: 80
End: 2025-12-02
Payer: MEDICARE